# Patient Record
Sex: MALE | Race: OTHER | Employment: OTHER | URBAN - METROPOLITAN AREA
[De-identification: names, ages, dates, MRNs, and addresses within clinical notes are randomized per-mention and may not be internally consistent; named-entity substitution may affect disease eponyms.]

---

## 2017-08-31 ENCOUNTER — OFFICE VISIT (OUTPATIENT)
Dept: OTOLARYNGOLOGY | Facility: CLINIC | Age: 62
End: 2017-08-31

## 2017-08-31 VITALS
HEART RATE: 64 BPM | BODY MASS INDEX: 32.14 KG/M2 | WEIGHT: 200 LBS | TEMPERATURE: 98 F | DIASTOLIC BLOOD PRESSURE: 86 MMHG | HEIGHT: 66 IN | SYSTOLIC BLOOD PRESSURE: 124 MMHG

## 2017-08-31 DIAGNOSIS — H93.13 TINNITUS AURIUM, BILATERAL: Primary | ICD-10-CM

## 2017-08-31 DIAGNOSIS — H91.93 BILATERAL HEARING LOSS, UNSPECIFIED HEARING LOSS TYPE: ICD-10-CM

## 2017-08-31 PROCEDURE — 99212 OFFICE O/P EST SF 10 MIN: CPT | Performed by: OTOLARYNGOLOGY

## 2017-08-31 PROCEDURE — 99203 OFFICE O/P NEW LOW 30 MIN: CPT | Performed by: OTOLARYNGOLOGY

## 2017-08-31 NOTE — PROGRESS NOTES
Maryelizabeth Apgar is a 63year old male. Patient presents with:  Ear Problem: Bilateral  Hearing Loss  Cerumen Impaction: Right ear      HISTORY OF PRESENT ILLNESS  He presents with progressive hearing loss tinnitus as well as imbalance for the past few years. Negative Easy bleeding and easy bruising.            PHYSICAL EXAM    /86 (BP Location: Left arm)   Pulse 64   Temp (!) 97.5 °F (36.4 °C) (Tympanic)   Ht 5' 6\" (1.676 m)   Wt 200 lb (90.7 kg)   BMI 32.28 kg/m²         Constitutional Normal Overall ap opposed to the left side as he was a  for 20 years or more. He also does complain of hearing loss bilaterally.   I have recommended that he undergo an audiogram we will have to get this authorized through his insurance before having it performed her

## 2017-09-09 ENCOUNTER — TELEPHONE (OUTPATIENT)
Dept: OTOLARYNGOLOGY | Facility: CLINIC | Age: 62
End: 2017-09-09

## 2017-09-09 NOTE — TELEPHONE ENCOUNTER
Pt dose not speak english, advised pt daughter that prior authorizatoin approved for hearing test, Authorization # [id number] and expires 09/30/17. Pt daughter states will call back to schedule.

## 2017-09-18 ENCOUNTER — TELEPHONE (OUTPATIENT)
Dept: OTOLARYNGOLOGY | Facility: CLINIC | Age: 62
End: 2017-09-18

## 2017-09-18 NOTE — TELEPHONE ENCOUNTER
pts daughter Jose Antony called. She is asking for another PA for HT for the month of October. Next available with [facility] audiologist is Saturday 9/30/17. Which she cannot make and the PA expires on 9/30/17.

## 2017-09-21 NOTE — TELEPHONE ENCOUNTER
Spoke to pts daughter. Informed PA for HT was extended starting 9/30/17 until 10/30/17. Provided Auth # [id number]. Spoke to Viridiana from insurance and received extension for HT 9/30/17-10/30/17. No further action required at this time.

## 2017-10-12 ENCOUNTER — OFFICE VISIT (OUTPATIENT)
Dept: AUDIOLOGY | Facility: CLINIC | Age: 62
End: 2017-10-12

## 2017-10-12 ENCOUNTER — OFFICE VISIT (OUTPATIENT)
Dept: OTOLARYNGOLOGY | Facility: CLINIC | Age: 62
End: 2017-10-12

## 2017-10-12 VITALS
WEIGHT: 200 LBS | BODY MASS INDEX: 32 KG/M2 | SYSTOLIC BLOOD PRESSURE: 126 MMHG | DIASTOLIC BLOOD PRESSURE: 80 MMHG | TEMPERATURE: 98 F

## 2017-10-12 DIAGNOSIS — H90.3 SENSORINEURAL HEARING LOSS, BILATERAL: ICD-10-CM

## 2017-10-12 DIAGNOSIS — H93.11 TINNITUS, RIGHT: ICD-10-CM

## 2017-10-12 DIAGNOSIS — H91.93 BILATERAL HEARING LOSS, UNSPECIFIED HEARING LOSS TYPE: ICD-10-CM

## 2017-10-12 DIAGNOSIS — H93.13 TINNITUS AURIUM, BILATERAL: Primary | ICD-10-CM

## 2017-10-12 PROCEDURE — 92550 TYMPANOMETRY & REFLEX THRESH: CPT | Performed by: AUDIOLOGIST

## 2017-10-12 PROCEDURE — 92557 COMPREHENSIVE HEARING TEST: CPT | Performed by: AUDIOLOGIST

## 2017-10-12 PROCEDURE — 99212 OFFICE O/P EST SF 10 MIN: CPT | Performed by: OTOLARYNGOLOGY

## 2017-10-12 PROCEDURE — 99214 OFFICE O/P EST MOD 30 MIN: CPT | Performed by: OTOLARYNGOLOGY

## 2017-10-12 RX ORDER — RANITIDINE 150 MG/1
150 TABLET ORAL 2 TIMES DAILY
COMMUNITY
Start: 2017-08-29 | End: 2018-07-05

## 2017-10-12 RX ORDER — DICLOFENAC SODIUM 75 MG/1
75 TABLET, DELAYED RELEASE ORAL 2 TIMES DAILY
COMMUNITY
Start: 2017-08-29 | End: 2018-07-05

## 2017-10-12 RX ORDER — HYDROCHLOROTHIAZIDE 25 MG/1
25 TABLET ORAL DAILY
COMMUNITY
Start: 2017-08-29 | End: 2018-07-05

## 2017-10-12 RX ORDER — METOPROLOL TARTRATE 100 MG/1
100 TABLET ORAL DAILY
COMMUNITY
Start: 2017-08-29 | End: 2018-07-05

## 2017-10-12 NOTE — PROGRESS NOTES
AUDIOLOGY REPORT      Maryelizabeth Apgar is a 63year old male     Referring Provider: Blayne Gorman   YOB: 1955  Medical Record: [id number]      Patient Hearing History:  Patient was referred for hearing testing by Dr. Fermin Vang.    He is present with

## 2017-10-12 NOTE — PROGRESS NOTES
Herrera King is a 63year old male. Patient presents with:  Hearing Loss: Ringing both ears      HISTORY OF PRESENT ILLNESS    He presents with progressive hearing loss tinnitus as well as imbalance for the past few years.   He does have a long history of and wheezing. Cardio Negative Chest pain, irregular heartbeat/palpitations and syncope. GI Negative Abdominal pain and diarrhea. Endocrine Negative Cold intolerance and heat intolerance. Neuro Negative Tremors.    Psych Negative Anxiety and depressi Oral Tab, , Disp: , Rfl:   ASSESSMENT AND PLAN    1. Tinnitus aurium, bilateral    2. Bilateral hearing loss, unspecified hearing loss type  Asymmetric hearing loss with a mixed loss on the left and now worse sensorineural loss on the right.   He does have

## 2018-07-05 ENCOUNTER — OFFICE VISIT (OUTPATIENT)
Dept: INTERNAL MEDICINE CLINIC | Facility: CLINIC | Age: 63
End: 2018-07-05

## 2018-07-05 ENCOUNTER — LAB ENCOUNTER (OUTPATIENT)
Dept: LAB | Age: 63
End: 2018-07-05
Attending: INTERNAL MEDICINE
Payer: COMMERCIAL

## 2018-07-05 VITALS
HEIGHT: 65.5 IN | HEART RATE: 60 BPM | BODY MASS INDEX: 34.07 KG/M2 | TEMPERATURE: 98 F | WEIGHT: 207 LBS | SYSTOLIC BLOOD PRESSURE: 130 MMHG | RESPIRATION RATE: 16 BRPM | DIASTOLIC BLOOD PRESSURE: 86 MMHG

## 2018-07-05 DIAGNOSIS — K21.9 GASTROESOPHAGEAL REFLUX DISEASE, ESOPHAGITIS PRESENCE NOT SPECIFIED: ICD-10-CM

## 2018-07-05 DIAGNOSIS — Z00.00 PREVENTATIVE HEALTH CARE: ICD-10-CM

## 2018-07-05 DIAGNOSIS — J30.9 ALLERGIC RHINITIS, UNSPECIFIED SEASONALITY, UNSPECIFIED TRIGGER: ICD-10-CM

## 2018-07-05 DIAGNOSIS — I10 ESSENTIAL HYPERTENSION: Primary | ICD-10-CM

## 2018-07-05 DIAGNOSIS — M25.50 CHRONIC JOINT PAIN: ICD-10-CM

## 2018-07-05 DIAGNOSIS — G89.29 CHRONIC JOINT PAIN: ICD-10-CM

## 2018-07-05 DIAGNOSIS — R25.2 LEG CRAMPS: ICD-10-CM

## 2018-07-05 DIAGNOSIS — Z12.11 SCREENING FOR COLON CANCER: ICD-10-CM

## 2018-07-05 LAB
ALBUMIN SERPL-MCNC: 3.8 G/DL (ref 3.5–4.8)
ALP LIVER SERPL-CCNC: 44 U/L (ref 45–117)
ALT SERPL-CCNC: 29 U/L (ref 17–63)
AST SERPL-CCNC: 18 U/L (ref 15–41)
BASOPHILS # BLD AUTO: 0.06 X10(3) UL (ref 0–0.1)
BASOPHILS NFR BLD AUTO: 0.9 %
BILIRUB SERPL-MCNC: 0.3 MG/DL (ref 0.1–2)
BUN BLD-MCNC: 22 MG/DL (ref 8–20)
CALCIUM BLD-MCNC: 8.5 MG/DL (ref 8.3–10.3)
CHLORIDE: 105 MMOL/L (ref 101–111)
CHOLEST SMN-MCNC: 192 MG/DL (ref ?–200)
CO2: 26 MMOL/L (ref 22–32)
COMPLEXED PSA SERPL-MCNC: 0.36 NG/ML (ref 0.01–4)
CREAT BLD-MCNC: 1.02 MG/DL (ref 0.7–1.3)
EOSINOPHIL # BLD AUTO: 0.28 X10(3) UL (ref 0–0.3)
EOSINOPHIL NFR BLD AUTO: 4.2 %
ERYTHROCYTE [DISTWIDTH] IN BLOOD BY AUTOMATED COUNT: 12.7 % (ref 11.5–16)
EST. AVERAGE GLUCOSE BLD GHB EST-MCNC: 114 MG/DL (ref 68–126)
GLUCOSE BLD-MCNC: 89 MG/DL (ref 70–99)
HBA1C MFR BLD HPLC: 5.6 % (ref ?–5.7)
HCT VFR BLD AUTO: 42.1 % (ref 37–53)
HDLC SERPL-MCNC: 40 MG/DL (ref 45–?)
HDLC SERPL: 4.8 {RATIO} (ref ?–4.97)
HGB BLD-MCNC: 14.1 G/DL (ref 13–17)
IMMATURE GRANULOCYTE COUNT: 0.01 X10(3) UL (ref 0–1)
IMMATURE GRANULOCYTE RATIO %: 0.1 %
LDLC SERPL CALC-MCNC: 111 MG/DL (ref ?–130)
LYMPHOCYTES # BLD AUTO: 2.75 X10(3) UL (ref 0.9–4)
LYMPHOCYTES NFR BLD AUTO: 41.2 %
M PROTEIN MFR SERPL ELPH: 7.2 G/DL (ref 6.1–8.3)
MCH RBC QN AUTO: 32 PG (ref 27–33.2)
MCHC RBC AUTO-ENTMCNC: 33.5 G/DL (ref 31–37)
MCV RBC AUTO: 95.7 FL (ref 80–99)
MONOCYTES # BLD AUTO: 0.73 X10(3) UL (ref 0.1–1)
MONOCYTES NFR BLD AUTO: 10.9 %
NEUTROPHIL ABS PRELIM: 2.85 X10 (3) UL (ref 1.3–6.7)
NEUTROPHILS # BLD AUTO: 2.85 X10(3) UL (ref 1.3–6.7)
NEUTROPHILS NFR BLD AUTO: 42.7 %
NONHDLC SERPL-MCNC: 152 MG/DL (ref ?–130)
PLATELET # BLD AUTO: 218 10(3)UL (ref 150–450)
POTASSIUM SERPL-SCNC: 3.9 MMOL/L (ref 3.6–5.1)
RBC # BLD AUTO: 4.4 X10(6)UL (ref 4.3–5.7)
RED CELL DISTRIBUTION WIDTH-SD: 45 FL (ref 35.1–46.3)
SODIUM SERPL-SCNC: 139 MMOL/L (ref 136–144)
TRIGL SERPL-MCNC: 203 MG/DL (ref ?–150)
TSI SER-ACNC: 1.48 MIU/ML (ref 0.35–5.5)
VLDLC SERPL CALC-MCNC: 41 MG/DL (ref 5–40)
WBC # BLD AUTO: 6.7 X10(3) UL (ref 4–13)

## 2018-07-05 PROCEDURE — 83036 HEMOGLOBIN GLYCOSYLATED A1C: CPT

## 2018-07-05 PROCEDURE — 99204 OFFICE O/P NEW MOD 45 MIN: CPT | Performed by: INTERNAL MEDICINE

## 2018-07-05 PROCEDURE — 85025 COMPLETE CBC W/AUTO DIFF WBC: CPT

## 2018-07-05 PROCEDURE — 84443 ASSAY THYROID STIM HORMONE: CPT

## 2018-07-05 PROCEDURE — 80053 COMPREHEN METABOLIC PANEL: CPT

## 2018-07-05 PROCEDURE — 80061 LIPID PANEL: CPT

## 2018-07-05 PROCEDURE — 36415 COLL VENOUS BLD VENIPUNCTURE: CPT

## 2018-07-05 RX ORDER — DICLOFENAC SODIUM 75 MG/1
75 TABLET, DELAYED RELEASE ORAL 2 TIMES DAILY PRN
Qty: 60 TABLET | Refills: 2 | Status: SHIPPED | OUTPATIENT
Start: 2018-07-05 | End: 2018-10-08

## 2018-07-05 RX ORDER — METOPROLOL TARTRATE 100 MG/1
100 TABLET ORAL DAILY
Qty: 90 TABLET | Refills: 1 | Status: SHIPPED | OUTPATIENT
Start: 2018-07-05 | End: 2018-12-29

## 2018-07-05 RX ORDER — AMLODIPINE BESYLATE 5 MG/1
5 TABLET ORAL DAILY
Qty: 90 TABLET | Refills: 1 | Status: SHIPPED | OUTPATIENT
Start: 2018-07-05 | End: 2018-12-29

## 2018-07-05 RX ORDER — HYDROCHLOROTHIAZIDE 25 MG/1
25 TABLET ORAL DAILY
Qty: 90 TABLET | Refills: 1 | Status: SHIPPED | OUTPATIENT
Start: 2018-07-05 | End: 2018-12-29

## 2018-07-05 RX ORDER — AMLODIPINE BESYLATE 5 MG/1
5 TABLET ORAL DAILY
COMMUNITY
End: 2018-07-05

## 2018-07-05 RX ORDER — RANITIDINE 150 MG/1
150 TABLET ORAL 2 TIMES DAILY
Qty: 90 TABLET | Refills: 3 | Status: SHIPPED | OUTPATIENT
Start: 2018-07-05 | End: 2020-09-03 | Stop reason: ALTCHOICE

## 2018-07-05 NOTE — PROGRESS NOTES
Flaca Win is a 63year old male. HPI:   Patient presents with:  Establish Care  Medication Follow-Up: fasting  Patient is a new patient, here to establish care. Son-in-law in room to translate [place]).       Chronic issues:  Patient has hypertensio (1986). Family: family history includes Cancer in his father; Other in his mother. Social:  reports that he has never smoked. He has never used smokeless tobacco. He reports that he does not drink alcohol or use drugs.   Wt Readings from Last 6 Encounters ordered. Referred to General Surgery for screening colonoscopy. Previous PCP - Dr. Dr. Reinaldo Peabody from [facility]. Records request form filled out.       Patient Care Team:  Chela Stinson MD as PCP - General (Internal Medicine)  The patient in

## 2018-07-05 NOTE — PATIENT INSTRUCTIONS
- Get blood work done today  - Follow up with Dr. Cedrick Barahona for colonoscopy. - Follow up with us in 6 months for chronic issues, earlier as needed. It was a pleasure seeing you in the clinic today.   Thank you for choosing the [facility]

## 2018-08-20 RX ORDER — QUININE SULFATE 324 MG/1
1 CAPSULE ORAL DAILY
Refills: 1 | COMMUNITY
Start: 2018-08-12 | End: 2019-04-22 | Stop reason: ALTCHOICE

## 2018-08-21 RX ORDER — QUININE SULFATE 324 MG/1
324 CAPSULE ORAL DAILY
Status: CANCELLED | OUTPATIENT
Start: 2018-08-21

## 2018-08-22 RX ORDER — QUININE SULFATE 324 MG/1
CAPSULE ORAL
Qty: 30 CAPSULE | Refills: 0 | OUTPATIENT
Start: 2018-08-22

## 2018-08-22 NOTE — TELEPHONE ENCOUNTER
I would not recommend taking prescription quinine on a long term basis - the prescription doses are only for treatment of acute malaria.   Long-term use of the prescription quinine for uses aside from malaria treatment (I believe patient is using it for res

## 2018-10-08 RX ORDER — DICLOFENAC SODIUM 75 MG/1
TABLET, DELAYED RELEASE ORAL
Qty: 60 TABLET | Refills: 1 | Status: SHIPPED | OUTPATIENT
Start: 2018-10-08 | End: 2018-12-06

## 2018-10-08 NOTE — TELEPHONE ENCOUNTER
Diclofenac 75 mg 1 tab bid prn filled 7-5-18 60  With 2 refills     LOV 7-5-18     Has chronic shoulder and back pain - takes diclofenac on an as-needed basis.       return to clinic in 6 months     Labs 7-5-18

## 2018-12-06 DIAGNOSIS — M25.50 CHRONIC JOINT PAIN: Primary | ICD-10-CM

## 2018-12-06 DIAGNOSIS — G89.29 CHRONIC JOINT PAIN: Primary | ICD-10-CM

## 2018-12-06 RX ORDER — DICLOFENAC SODIUM 75 MG/1
75 TABLET, DELAYED RELEASE ORAL 2 TIMES DAILY PRN
Qty: 60 TABLET | Refills: 2 | Status: SHIPPED | OUTPATIENT
Start: 2018-12-06 | End: 2022-03-16

## 2018-12-31 RX ORDER — AMLODIPINE BESYLATE 5 MG/1
TABLET ORAL
Qty: 90 TABLET | Refills: 0 | Status: SHIPPED | OUTPATIENT
Start: 2018-12-31 | End: 2019-03-26

## 2018-12-31 RX ORDER — METOPROLOL TARTRATE 100 MG/1
TABLET ORAL
Qty: 90 TABLET | Refills: 0 | Status: SHIPPED | OUTPATIENT
Start: 2018-12-31 | End: 2019-03-26

## 2018-12-31 RX ORDER — HYDROCHLOROTHIAZIDE 25 MG/1
TABLET ORAL
Qty: 90 TABLET | Refills: 0 | Status: SHIPPED | OUTPATIENT
Start: 2018-12-31 | End: 2019-03-26

## 2018-12-31 NOTE — TELEPHONE ENCOUNTER
Refill requested:   Requested Prescriptions     Pending Prescriptions Disp Refills   • METOPROLOL TARTRATE 100 MG Oral Tab [Pharmacy Med Name: METOPROLOL TARTRATE 100MG TABLETS] 90 tablet 0     Sig: TAKE 1 TABLET(100 MG) BY MOUTH DAILY   • HYDROCHLOROTHIAZ

## 2019-03-26 DIAGNOSIS — I10 ESSENTIAL HYPERTENSION: Primary | ICD-10-CM

## 2019-03-26 RX ORDER — AMLODIPINE BESYLATE 5 MG/1
TABLET ORAL
Qty: 90 TABLET | Refills: 0 | Status: SHIPPED | OUTPATIENT
Start: 2019-03-26 | End: 2019-06-12

## 2019-03-26 RX ORDER — HYDROCHLOROTHIAZIDE 25 MG/1
TABLET ORAL
Qty: 90 TABLET | Refills: 0 | Status: SHIPPED | OUTPATIENT
Start: 2019-03-26 | End: 2019-06-12

## 2019-03-26 RX ORDER — METOPROLOL TARTRATE 100 MG/1
TABLET ORAL
Qty: 90 TABLET | Refills: 0 | Status: SHIPPED | OUTPATIENT
Start: 2019-03-26 | End: 2019-06-12

## 2019-03-26 NOTE — TELEPHONE ENCOUNTER
Last OV: 7/5/18 with Dr. Frank Welch  Last refill date: 12/31/18      #/refills: #90, 0 refills  When pt was asked to return for OV: 6 months   Upcoming appt/reason: no upcoming appt  Last labs 7/5/18

## 2019-04-17 ENCOUNTER — PATIENT OUTREACH (OUTPATIENT)
Dept: INTERNAL MEDICINE CLINIC | Facility: CLINIC | Age: 64
End: 2019-04-17

## 2019-04-17 NOTE — PROGRESS NOTES
Pt is IHP so will be on [employer]BS list but is HTN and not showing GAP for this FYI. Is due for appointment, last seen 7/2018.

## 2019-04-22 ENCOUNTER — OFFICE VISIT (OUTPATIENT)
Dept: INTERNAL MEDICINE CLINIC | Facility: CLINIC | Age: 64
End: 2019-04-22

## 2019-04-22 VITALS
TEMPERATURE: 98 F | SYSTOLIC BLOOD PRESSURE: 130 MMHG | RESPIRATION RATE: 16 BRPM | WEIGHT: 205.5 LBS | DIASTOLIC BLOOD PRESSURE: 84 MMHG | HEART RATE: 52 BPM | HEIGHT: 64.75 IN | BODY MASS INDEX: 34.66 KG/M2

## 2019-04-22 DIAGNOSIS — M25.50 CHRONIC JOINT PAIN: Chronic | ICD-10-CM

## 2019-04-22 DIAGNOSIS — R25.2 LEG CRAMPS: Primary | ICD-10-CM

## 2019-04-22 DIAGNOSIS — G89.29 CHRONIC JOINT PAIN: Chronic | ICD-10-CM

## 2019-04-22 DIAGNOSIS — K21.9 GASTROESOPHAGEAL REFLUX DISEASE, ESOPHAGITIS PRESENCE NOT SPECIFIED: Chronic | ICD-10-CM

## 2019-04-22 DIAGNOSIS — Z12.11 SCREENING FOR COLON CANCER: ICD-10-CM

## 2019-04-22 DIAGNOSIS — G25.81 RESTLESS LEGS: ICD-10-CM

## 2019-04-22 DIAGNOSIS — E78.2 MIXED HYPERLIPIDEMIA: ICD-10-CM

## 2019-04-22 DIAGNOSIS — I10 ESSENTIAL HYPERTENSION: Chronic | ICD-10-CM

## 2019-04-22 DIAGNOSIS — J30.9 ALLERGIC RHINITIS, UNSPECIFIED SEASONALITY, UNSPECIFIED TRIGGER: Chronic | ICD-10-CM

## 2019-04-22 PROCEDURE — 99214 OFFICE O/P EST MOD 30 MIN: CPT | Performed by: INTERNAL MEDICINE

## 2019-04-22 RX ORDER — ROPINIROLE 0.5 MG/1
0.5 TABLET, FILM COATED ORAL NIGHTLY
Qty: 30 TABLET | Refills: 0 | Status: SHIPPED | OUTPATIENT
Start: 2019-04-22 | End: 2019-05-18

## 2019-04-22 NOTE — PROGRESS NOTES
Carlyn Cárdenas is a 66year old male. HPI:   Patient presents with:  Hypertension  Patient presents for follow up on chronic medical issues. Hypertension - at goal blood pressure. Hyperlipidemia - lifestyle controlled. Chronic joint pain - stable.   GE drinks alcohol. He reports that he does not use drugs.   Wt Readings from Last 6 Encounters:  04/22/19 : 205 lb 8 oz  07/05/18 : 207 lb  10/12/17 : 200 lb  08/31/17 : 200 lb    EXAM:   /84 (BP Location: Left arm, Patient Position: Sitting, Cuff Size: issues and agrees to the plan. The patient is asked to return to clinic in 6 months for follow up on chronic issues, or earlier if acute issues arise.     Rhiannon Rodríguez MD

## 2019-04-22 NOTE — PATIENT INSTRUCTIONS
- Continue current blood pressure medications  - Get cholesterol test done when fasting (at least 8 hours, water and medications only). Our lab is open Monday - Friday, 7 AM - 4:15 PM ([place]   - Do stool blood test (see kit instructions)  - We

## 2019-04-23 PROBLEM — I10 ESSENTIAL HYPERTENSION: Chronic | Status: ACTIVE | Noted: 2018-07-05

## 2019-04-23 PROBLEM — E78.2 MIXED HYPERLIPIDEMIA: Chronic | Status: ACTIVE | Noted: 2019-04-23

## 2019-04-23 PROBLEM — E78.2 MIXED HYPERLIPIDEMIA: Status: ACTIVE | Noted: 2019-04-23

## 2019-04-23 PROBLEM — M25.50 CHRONIC JOINT PAIN: Chronic | Status: ACTIVE | Noted: 2018-07-05

## 2019-04-23 PROBLEM — R25.2 LEG CRAMPS: Chronic | Status: ACTIVE | Noted: 2018-07-05

## 2019-04-23 PROBLEM — J30.9 ALLERGIC RHINITIS: Chronic | Status: ACTIVE | Noted: 2018-07-05

## 2019-04-23 PROBLEM — G89.29 CHRONIC JOINT PAIN: Chronic | Status: ACTIVE | Noted: 2018-07-05

## 2019-04-23 PROBLEM — K21.9 GERD (GASTROESOPHAGEAL REFLUX DISEASE): Chronic | Status: ACTIVE | Noted: 2018-07-05

## 2019-04-23 PROBLEM — G25.81 RESTLESS LEGS: Status: ACTIVE | Noted: 2019-04-23

## 2019-05-18 DIAGNOSIS — R25.2 LEG CRAMPS: ICD-10-CM

## 2019-05-18 DIAGNOSIS — G25.81 RESTLESS LEGS: ICD-10-CM

## 2019-05-20 NOTE — TELEPHONE ENCOUNTER
Failed protocol     Last refill:   4/22/2019 #30 NR    LOV:  4/22/2019 Dr Taty Rivera RTC 6 months   2. Restless legs   Upon further discussion it appears patient has symptoms of restless leg syndrome. He has been taking high dose quinine.   I discussed that t

## 2019-05-21 RX ORDER — ROPINIROLE 0.5 MG/1
TABLET, FILM COATED ORAL
Qty: 90 TABLET | Refills: 1 | Status: SHIPPED | OUTPATIENT
Start: 2019-05-21 | End: 2019-09-13

## 2019-05-24 ENCOUNTER — PATIENT OUTREACH (OUTPATIENT)
Dept: INTERNAL MEDICINE CLINIC | Facility: CLINIC | Age: 64
End: 2019-05-24

## 2019-05-24 NOTE — PROGRESS NOTES
LMTCB x 1    Pt is due for Colon Screen. FIT test ordered on 4/22/2019, has pt been able to complete test?   If pt needs another test we can mail one out to him and he can drop it off at any [facility].

## 2019-05-24 NOTE — PROGRESS NOTES
Patient's daughter called back. States that he did receive the FIT test, however he has been unable to complete it.  Patient will make sure to complete it as soon as he can

## 2019-06-02 ENCOUNTER — APPOINTMENT (OUTPATIENT)
Dept: LAB | Facility: HOSPITAL | Age: 64
End: 2019-06-02
Attending: INTERNAL MEDICINE
Payer: COMMERCIAL

## 2019-06-02 DIAGNOSIS — Z12.11 SCREENING FOR COLON CANCER: ICD-10-CM

## 2019-06-02 PROCEDURE — 82274 ASSAY TEST FOR BLOOD FECAL: CPT

## 2019-06-12 DIAGNOSIS — I10 ESSENTIAL HYPERTENSION: ICD-10-CM

## 2019-06-12 RX ORDER — AMLODIPINE BESYLATE 5 MG/1
TABLET ORAL
Qty: 90 TABLET | Refills: 0 | Status: SHIPPED | OUTPATIENT
Start: 2019-06-12 | End: 2019-09-13

## 2019-06-12 RX ORDER — HYDROCHLOROTHIAZIDE 25 MG/1
TABLET ORAL
Qty: 90 TABLET | Refills: 0 | Status: SHIPPED | OUTPATIENT
Start: 2019-06-12 | End: 2019-09-13

## 2019-06-12 RX ORDER — METOPROLOL TARTRATE 100 MG/1
TABLET ORAL
Qty: 90 TABLET | Refills: 0 | Status: SHIPPED | OUTPATIENT
Start: 2019-06-12 | End: 2019-09-13

## 2019-06-12 NOTE — TELEPHONE ENCOUNTER
Hydrochlorothiazide 25 mg 1 tab daily filled 3/26/19 90 with 0 refills   Amlodipine 5 mg 1 tab daily filled 3/26/19 90 with 0 refills   Metoprolol 100 mg 1 tab daily filled 3/26/19 90 with 0 refills     LOV 4/22/19  return to clinic in 6 months   No upcomi

## 2019-06-18 ENCOUNTER — TELEPHONE (OUTPATIENT)
Dept: INTERNAL MEDICINE CLINIC | Facility: CLINIC | Age: 64
End: 2019-06-18

## 2019-07-12 ENCOUNTER — TELEPHONE (OUTPATIENT)
Dept: INTERNAL MEDICINE CLINIC | Facility: CLINIC | Age: 64
End: 2019-07-12

## 2019-07-12 NOTE — TELEPHONE ENCOUNTER
[facility] pharmacy is calling to get a refill on the patient's prescription for HYDROCHLOROTHIAZIDE 25 MG Oral Tab, ROPINIROLE HCL 0.5 MG Oral Tab, AMLODIPINE BESYLATE 5 MG Oral Tab, METOPROLOL TARTRATE 100 MG Oral Tab [facility] [id number] [facility]

## 2019-07-15 NOTE — TELEPHONE ENCOUNTER
Clarified with [facility] that pt did receive   #90 refills on all 4 medications and is not in need of refills at this time. They are looking for refill to \"syncronize all refills\".     Informed pharmacy that pt can request refills at the time of need

## 2019-09-13 DIAGNOSIS — R25.2 LEG CRAMPS: ICD-10-CM

## 2019-09-13 DIAGNOSIS — I10 ESSENTIAL HYPERTENSION: ICD-10-CM

## 2019-09-13 DIAGNOSIS — G25.81 RESTLESS LEGS: ICD-10-CM

## 2019-09-13 RX ORDER — HYDROCHLOROTHIAZIDE 25 MG/1
TABLET ORAL
Qty: 90 TABLET | Refills: 1 | Status: SHIPPED | OUTPATIENT
Start: 2019-09-13 | End: 2020-03-11

## 2019-09-13 RX ORDER — METOPROLOL TARTRATE 100 MG/1
TABLET ORAL
Qty: 90 TABLET | Refills: 1 | Status: SHIPPED | OUTPATIENT
Start: 2019-09-13 | End: 2020-03-11

## 2019-09-13 RX ORDER — ROPINIROLE 0.5 MG/1
TABLET, FILM COATED ORAL
Qty: 90 TABLET | Refills: 1 | Status: SHIPPED | OUTPATIENT
Start: 2019-09-13 | End: 2020-03-11

## 2019-09-13 RX ORDER — AMLODIPINE BESYLATE 5 MG/1
TABLET ORAL
Qty: 90 TABLET | Refills: 1 | Status: SHIPPED | OUTPATIENT
Start: 2019-09-13 | End: 2020-03-11

## 2019-09-13 NOTE — TELEPHONE ENCOUNTER
Failed protocol     Last refill:  6/12/2019 Metoprolol 100 mg #90 + Amlodipine 5 mg #90 NR + HCTZ 25 mg #90 NR   5/21/2019 Ropinirole . 5 mg #90 1R    LOV;   4/22/2019 Dr Dawson Don RTC 6 months   2.  Restless legs  Upon further discussion it appears patient ha

## 2019-09-13 NOTE — TELEPHONE ENCOUNTER
Spoke to pts dtr (on communication waiver) and informed pt is due for appt. She will have pt call our office back.

## 2020-03-11 DIAGNOSIS — G25.81 RESTLESS LEGS: ICD-10-CM

## 2020-03-11 DIAGNOSIS — Z00.00 PREVENTATIVE HEALTH CARE: ICD-10-CM

## 2020-03-11 DIAGNOSIS — I10 ESSENTIAL HYPERTENSION: ICD-10-CM

## 2020-03-11 DIAGNOSIS — Z12.5 SCREENING FOR MALIGNANT NEOPLASM OF PROSTATE: ICD-10-CM

## 2020-03-11 DIAGNOSIS — R25.2 LEG CRAMPS: ICD-10-CM

## 2020-03-11 DIAGNOSIS — E78.2 MIXED HYPERLIPIDEMIA: Primary | Chronic | ICD-10-CM

## 2020-03-11 RX ORDER — ROPINIROLE 0.5 MG/1
TABLET, FILM COATED ORAL
Qty: 90 TABLET | Refills: 1 | Status: SHIPPED | OUTPATIENT
Start: 2020-03-11 | End: 2020-07-06

## 2020-03-11 RX ORDER — HYDROCHLOROTHIAZIDE 25 MG/1
TABLET ORAL
Qty: 30 TABLET | Refills: 0 | Status: SHIPPED | OUTPATIENT
Start: 2020-03-11 | End: 2020-06-10

## 2020-03-11 RX ORDER — METOPROLOL TARTRATE 100 MG/1
TABLET ORAL
Qty: 30 TABLET | Refills: 0 | Status: SHIPPED | OUTPATIENT
Start: 2020-03-11 | End: 2020-06-10

## 2020-03-11 RX ORDER — AMLODIPINE BESYLATE 5 MG/1
TABLET ORAL
Qty: 30 TABLET | Refills: 0 | Status: SHIPPED | OUTPATIENT
Start: 2020-03-11 | End: 2020-06-12

## 2020-03-11 NOTE — TELEPHONE ENCOUNTER
Failed protocol     Last refill:  09/13/2019 Ropinirole ,5 mg #90 1R  09/13/2019 Amlodipine 5 mg #90 1R  09/13/2019 Metoprolol 100 mg #90 1R  09/13/2019 HCTZ 25 mg #90 1R    LOV:   04/22/2019 Dr Evelio Wing RTC 6 months  No FOV scheduled    **Spoke to pts dtr

## 2020-06-10 DIAGNOSIS — I10 ESSENTIAL HYPERTENSION: ICD-10-CM

## 2020-06-11 ENCOUNTER — TELEPHONE (OUTPATIENT)
Dept: INTERNAL MEDICINE CLINIC | Facility: CLINIC | Age: 65
End: 2020-06-11

## 2020-06-11 DIAGNOSIS — I10 ESSENTIAL HYPERTENSION: ICD-10-CM

## 2020-06-11 RX ORDER — HYDROCHLOROTHIAZIDE 25 MG/1
25 TABLET ORAL DAILY
Qty: 90 TABLET | Refills: 0 | Status: SHIPPED | OUTPATIENT
Start: 2020-06-11 | End: 2020-09-09

## 2020-06-11 RX ORDER — METOPROLOL TARTRATE 100 MG/1
100 TABLET ORAL DAILY
Qty: 90 TABLET | Refills: 0 | Status: SHIPPED | OUTPATIENT
Start: 2020-06-11 | End: 2020-09-09

## 2020-06-11 NOTE — TELEPHONE ENCOUNTER
Metoprolol 100 mg 1 tab daily filled 3-11-20 30 with 0 refills   Hydrochlorothiazide 25 mg 1 tab daily filled 3-11-20 30 with 0 refills     LOV 4-22-19   return to clinic in 6 months   No upcoming apt on file   Labs 7-5-18     Apt made 6-15-20 pt has a blo

## 2020-06-12 RX ORDER — METOPROLOL TARTRATE 100 MG/1
TABLET ORAL
Qty: 30 TABLET | Refills: 0 | OUTPATIENT
Start: 2020-06-12

## 2020-06-12 RX ORDER — AMLODIPINE BESYLATE 5 MG/1
TABLET ORAL
Qty: 90 TABLET | Refills: 0 | Status: SHIPPED | OUTPATIENT
Start: 2020-06-12 | End: 2020-06-16

## 2020-06-12 RX ORDER — HYDROCHLOROTHIAZIDE 25 MG/1
TABLET ORAL
Qty: 30 TABLET | Refills: 0 | OUTPATIENT
Start: 2020-06-12

## 2020-06-12 NOTE — TELEPHONE ENCOUNTER
[address] called and stated that they never received the prescription for AMLODIPINE BESYLATE 5MG TABLETS. Please resend prescription.  [facility] [address] [facility], [address], [phone], [phone]

## 2020-06-12 NOTE — TELEPHONE ENCOUNTER
AMLODIPINE BESYLATE 5MG TABLETS  Protocol Failed     LOV: 4/22/19   RTC: 6 months   Upcoming OV: 6/15/2020   Filled: 3/11/2020 #30, 0 refills   Recent labs: 7/5/18     METOPROLOL TARTRATE 100MG TABLETS  HYDROCHLOROTHIAZIDE 25MG TABLETS  These medications w

## 2020-06-15 ENCOUNTER — TELEMEDICINE (OUTPATIENT)
Dept: INTERNAL MEDICINE CLINIC | Facility: CLINIC | Age: 65
End: 2020-06-15

## 2020-06-15 VITALS — SYSTOLIC BLOOD PRESSURE: 130 MMHG | DIASTOLIC BLOOD PRESSURE: 76 MMHG

## 2020-06-15 DIAGNOSIS — R25.2 LEG CRAMPS: Chronic | ICD-10-CM

## 2020-06-15 DIAGNOSIS — K21.9 GASTROESOPHAGEAL REFLUX DISEASE, ESOPHAGITIS PRESENCE NOT SPECIFIED: Chronic | ICD-10-CM

## 2020-06-15 DIAGNOSIS — M25.50 CHRONIC JOINT PAIN: Chronic | ICD-10-CM

## 2020-06-15 DIAGNOSIS — I10 ESSENTIAL HYPERTENSION: Primary | Chronic | ICD-10-CM

## 2020-06-15 DIAGNOSIS — G89.29 CHRONIC JOINT PAIN: Chronic | ICD-10-CM

## 2020-06-15 DIAGNOSIS — Z12.11 SCREENING FOR MALIGNANT NEOPLASM OF COLON: ICD-10-CM

## 2020-06-15 DIAGNOSIS — G25.81 RESTLESS LEGS: ICD-10-CM

## 2020-06-15 DIAGNOSIS — J30.9 ALLERGIC RHINITIS, UNSPECIFIED SEASONALITY, UNSPECIFIED TRIGGER: Chronic | ICD-10-CM

## 2020-06-15 DIAGNOSIS — E78.2 MIXED HYPERLIPIDEMIA: Chronic | ICD-10-CM

## 2020-06-15 PROCEDURE — 99214 OFFICE O/P EST MOD 30 MIN: CPT | Performed by: INTERNAL MEDICINE

## 2020-06-15 NOTE — PROGRESS NOTES
Dulce Keller is a 60year old male here today for a telemedicine/video visit via [facility]. He is in the state of [place] during this visit. Dulce Keller verbally consents to a Video visit service on 06/15/20.   Patient understands and accepts financial r affect    ASSESSMENT/PLAN:  1. Essential hypertension  Excellent readings at home including during today's video visit. Continue amlodipine 5 mg qd, HCTZ 25 mg qd, metoprolol tartrate 100 mg qd.   May consider switching metoprolol to long-acting succinate

## 2020-06-16 RX ORDER — AMLODIPINE BESYLATE 5 MG/1
5 TABLET ORAL DAILY
Qty: 90 TABLET | Refills: 0 | Status: SHIPPED | OUTPATIENT
Start: 2020-06-16 | End: 2020-12-07

## 2020-07-05 DIAGNOSIS — R25.2 LEG CRAMPS: ICD-10-CM

## 2020-07-05 DIAGNOSIS — G25.81 RESTLESS LEGS: ICD-10-CM

## 2020-07-06 RX ORDER — ROPINIROLE 0.5 MG/1
TABLET, FILM COATED ORAL
Qty: 90 TABLET | Refills: 1 | Status: SHIPPED | OUTPATIENT
Start: 2020-07-06 | End: 2021-04-01

## 2020-07-06 NOTE — TELEPHONE ENCOUNTER
Failed protocol     Last refill:  3/11/2020 Ropinirole 0.5 mg #90 1R    Last virtual appt 6/15/2020 Dr Ordonez Face RTC 6 months  7. Leg cramps  Symptoms much better since he is working less. Not really using the ropinirole right now.   No FOV scheduled

## 2020-07-23 ENCOUNTER — LABORATORY ENCOUNTER (OUTPATIENT)
Dept: LAB | Age: 65
End: 2020-07-23
Attending: INTERNAL MEDICINE
Payer: COMMERCIAL

## 2020-07-23 DIAGNOSIS — Z00.00 PREVENTATIVE HEALTH CARE: ICD-10-CM

## 2020-07-23 DIAGNOSIS — Z12.5 SCREENING FOR MALIGNANT NEOPLASM OF PROSTATE: ICD-10-CM

## 2020-07-23 DIAGNOSIS — I10 ESSENTIAL HYPERTENSION: ICD-10-CM

## 2020-07-23 DIAGNOSIS — E78.2 MIXED HYPERLIPIDEMIA: Chronic | ICD-10-CM

## 2020-07-23 LAB
ALBUMIN SERPL-MCNC: 3.8 G/DL (ref 3.4–5)
ALBUMIN/GLOB SERPL: 1.1 {RATIO} (ref 1–2)
ALP LIVER SERPL-CCNC: 42 U/L (ref 45–117)
ALT SERPL-CCNC: 30 U/L (ref 16–61)
ANION GAP SERPL CALC-SCNC: 3 MMOL/L (ref 0–18)
AST SERPL-CCNC: 14 U/L (ref 15–37)
BASOPHILS # BLD AUTO: 0.06 X10(3) UL (ref 0–0.2)
BASOPHILS NFR BLD AUTO: 0.9 %
BILIRUB SERPL-MCNC: 0.5 MG/DL (ref 0.1–2)
BUN BLD-MCNC: 16 MG/DL (ref 7–18)
BUN/CREAT SERPL: 15.5 (ref 10–20)
CALCIUM BLD-MCNC: 8.8 MG/DL (ref 8.5–10.1)
CHLORIDE SERPL-SCNC: 105 MMOL/L (ref 98–112)
CHOLEST SMN-MCNC: 209 MG/DL (ref ?–200)
CO2 SERPL-SCNC: 29 MMOL/L (ref 21–32)
COMPLEXED PSA SERPL-MCNC: 0.38 NG/ML (ref ?–4)
CREAT BLD-MCNC: 1.03 MG/DL (ref 0.7–1.3)
DEPRECATED RDW RBC AUTO: 43.8 FL (ref 35.1–46.3)
EOSINOPHIL # BLD AUTO: 0.29 X10(3) UL (ref 0–0.7)
EOSINOPHIL NFR BLD AUTO: 4.4 %
ERYTHROCYTE [DISTWIDTH] IN BLOOD BY AUTOMATED COUNT: 12.8 % (ref 11–15)
EST. AVERAGE GLUCOSE BLD GHB EST-MCNC: 105 MG/DL (ref 68–126)
GLOBULIN PLAS-MCNC: 3.4 G/DL (ref 2.8–4.4)
GLUCOSE BLD-MCNC: 89 MG/DL (ref 70–99)
HBA1C MFR BLD HPLC: 5.3 % (ref ?–5.7)
HCT VFR BLD AUTO: 42.1 % (ref 39–53)
HDLC SERPL-MCNC: 32 MG/DL (ref 40–59)
HGB BLD-MCNC: 14.3 G/DL (ref 13–17.5)
IMM GRANULOCYTES # BLD AUTO: 0.01 X10(3) UL (ref 0–1)
IMM GRANULOCYTES NFR BLD: 0.2 %
LDLC SERPL CALC-MCNC: 131 MG/DL (ref ?–100)
LYMPHOCYTES # BLD AUTO: 3.15 X10(3) UL (ref 1–4)
LYMPHOCYTES NFR BLD AUTO: 48 %
M PROTEIN MFR SERPL ELPH: 7.2 G/DL (ref 6.4–8.2)
MCH RBC QN AUTO: 31.7 PG (ref 26–34)
MCHC RBC AUTO-ENTMCNC: 34 G/DL (ref 31–37)
MCV RBC AUTO: 93.3 FL (ref 80–100)
MONOCYTES # BLD AUTO: 0.64 X10(3) UL (ref 0.1–1)
MONOCYTES NFR BLD AUTO: 9.8 %
NEUTROPHILS # BLD AUTO: 2.41 X10 (3) UL (ref 1.5–7.7)
NEUTROPHILS # BLD AUTO: 2.41 X10(3) UL (ref 1.5–7.7)
NEUTROPHILS NFR BLD AUTO: 36.7 %
NONHDLC SERPL-MCNC: 177 MG/DL (ref ?–130)
OSMOLALITY SERPL CALC.SUM OF ELEC: 285 MOSM/KG (ref 275–295)
PATIENT FASTING Y/N/NP: YES
PATIENT FASTING Y/N/NP: YES
PLATELET # BLD AUTO: 177 10(3)UL (ref 150–450)
POTASSIUM SERPL-SCNC: 3.9 MMOL/L (ref 3.5–5.1)
RBC # BLD AUTO: 4.51 X10(6)UL (ref 4.3–5.7)
SODIUM SERPL-SCNC: 137 MMOL/L (ref 136–145)
TRIGL SERPL-MCNC: 229 MG/DL (ref 30–149)
TSI SER-ACNC: 1.79 MIU/ML (ref 0.36–3.74)
VLDLC SERPL CALC-MCNC: 46 MG/DL (ref 0–30)
WBC # BLD AUTO: 6.6 X10(3) UL (ref 4–11)

## 2020-07-23 PROCEDURE — 36415 COLL VENOUS BLD VENIPUNCTURE: CPT

## 2020-07-23 PROCEDURE — 84443 ASSAY THYROID STIM HORMONE: CPT

## 2020-07-23 PROCEDURE — 85025 COMPLETE CBC W/AUTO DIFF WBC: CPT

## 2020-07-23 PROCEDURE — 80053 COMPREHEN METABOLIC PANEL: CPT

## 2020-07-23 PROCEDURE — 83036 HEMOGLOBIN GLYCOSYLATED A1C: CPT

## 2020-07-23 PROCEDURE — 80061 LIPID PANEL: CPT

## 2020-07-28 ENCOUNTER — TELEPHONE (OUTPATIENT)
Dept: INTERNAL MEDICINE CLINIC | Facility: CLINIC | Age: 65
End: 2020-07-28

## 2020-07-28 DIAGNOSIS — E78.5 HYPERLIPIDEMIA, UNSPECIFIED HYPERLIPIDEMIA TYPE: Primary | ICD-10-CM

## 2020-07-29 RX ORDER — ATORVASTATIN CALCIUM 10 MG/1
10 TABLET, FILM COATED ORAL NIGHTLY
Qty: 90 TABLET | Refills: 1 | Status: SHIPPED | OUTPATIENT
Start: 2020-07-29 | End: 2021-01-22

## 2020-09-03 ENCOUNTER — OFFICE VISIT (OUTPATIENT)
Dept: INTERNAL MEDICINE CLINIC | Facility: CLINIC | Age: 65
End: 2020-09-03

## 2020-09-03 DIAGNOSIS — I10 ESSENTIAL HYPERTENSION: Chronic | ICD-10-CM

## 2020-09-03 DIAGNOSIS — R21 RASH AND NONSPECIFIC SKIN ERUPTION: ICD-10-CM

## 2020-09-03 DIAGNOSIS — K21.9 GASTROESOPHAGEAL REFLUX DISEASE, ESOPHAGITIS PRESENCE NOT SPECIFIED: Chronic | ICD-10-CM

## 2020-09-03 DIAGNOSIS — J30.9 ALLERGIC RHINITIS, UNSPECIFIED SEASONALITY, UNSPECIFIED TRIGGER: Primary | Chronic | ICD-10-CM

## 2020-09-03 DIAGNOSIS — E78.2 MIXED HYPERLIPIDEMIA: Chronic | ICD-10-CM

## 2020-09-03 PROCEDURE — 86003 ALLG SPEC IGE CRUDE XTRC EA: CPT | Performed by: INTERNAL MEDICINE

## 2020-09-03 PROCEDURE — 3008F BODY MASS INDEX DOCD: CPT | Performed by: INTERNAL MEDICINE

## 2020-09-03 PROCEDURE — 3075F SYST BP GE 130 - 139MM HG: CPT | Performed by: INTERNAL MEDICINE

## 2020-09-03 PROCEDURE — 3079F DIAST BP 80-89 MM HG: CPT | Performed by: INTERNAL MEDICINE

## 2020-09-03 PROCEDURE — 82785 ASSAY OF IGE: CPT | Performed by: INTERNAL MEDICINE

## 2020-09-03 PROCEDURE — 99214 OFFICE O/P EST MOD 30 MIN: CPT | Performed by: INTERNAL MEDICINE

## 2020-09-03 RX ORDER — AZELASTINE HYDROCHLORIDE 0.5 MG/ML
1 SOLUTION/ DROPS OPHTHALMIC 2 TIMES DAILY
Qty: 6 ML | Refills: 1 | Status: SHIPPED | OUTPATIENT
Start: 2020-09-03

## 2020-09-03 RX ORDER — AZELASTINE 1 MG/ML
1 SPRAY, METERED NASAL 2 TIMES DAILY
Qty: 30 ML | Refills: 1 | Status: SHIPPED | OUTPATIENT
Start: 2020-09-03

## 2020-09-03 NOTE — PATIENT INSTRUCTIONS
- We will check allergy blood tests today  - Start azelastine nasal spray and eye drops.   Use twice daily.  - If this does not help your symptoms within 1-2 weeks, let us know - we may refer you to an allergist at that point.  - For the skin rash, start tr

## 2020-09-03 NOTE — PROGRESS NOTES
Sowmya Arriaga is a 63year old male. HPI:   Patient presents with: Allergies  Rash    Patient presents with complaints of allergic symptoms for two weeks. Sneezing, congestion, watery eyes, itching. Also with bites/spots on arms and legs.   Areas itch daily., Disp: 90 tablet, Rfl: 0  •  Diclofenac Sodium 75 MG Oral Tab EC, Take 1 tablet (75 mg total) by mouth 2 (two) times daily as needed. , Disp: 60 tablet, Rfl: 2  Medical:  has a past medical history of Tachycardia.   Surgical:  has a past surgical hist External Cream; Apply topically 2 (two) times daily as needed. Dispense: 45 g; Refill: 1    3. Essential hypertension  Repeat reading at goal.  Continue amlodipine 5 mg qd, HCTZ 25 mg qd, metoprolol succinate 100 mg qd.     4. Mixed hyperlipidemia  Elevate

## 2020-09-06 VITALS
WEIGHT: 225 LBS | RESPIRATION RATE: 16 BRPM | HEART RATE: 60 BPM | SYSTOLIC BLOOD PRESSURE: 136 MMHG | TEMPERATURE: 98 F | HEIGHT: 64.75 IN | BODY MASS INDEX: 37.95 KG/M2 | DIASTOLIC BLOOD PRESSURE: 84 MMHG

## 2020-09-07 DIAGNOSIS — I10 ESSENTIAL HYPERTENSION: ICD-10-CM

## 2020-09-08 LAB
A ALTERNATA IGE QN: <0.1 KUA/L (ref ?–0.1)
A FUMIGATUS IGE QN: <0.1 KUA/L (ref ?–0.1)
AMER SYCAMORE IGE QN: <0.1 KUA/L (ref ?–0.1)
BERMUDA GRASS IGE QN: <0.1 KUA/L (ref ?–0.1)
BOXELDER IGE QN: <0.1 KUA/L (ref ?–0.1)
C HERBARUM IGE QN: <0.1 KUA/L (ref ?–0.1)
CALIF WALNUT IGE QN: <0.1 KUA/L (ref ?–0.1)
CAT DANDER IGE QN: <0.1 KUA/L (ref ?–0.1)
CMN PIGWEED IGE QN: <0.1 KUA/L (ref ?–0.1)
COMMON RAGWEED IGE QN: 4.07 KUA/L (ref ?–0.1)
COTTONWOOD IGE QN: <0.1 KUA/L (ref ?–0.1)
D FARINAE IGE QN: <0.1 KUA/L (ref ?–0.1)
D PTERONYSS IGE QN: <0.1 KUA/L (ref ?–0.1)
DOG DANDER IGE QN: <0.1 KUA/L (ref ?–0.1)
IGE SERPL-ACNC: 39.1 KU/L (ref 2–214)
M RACEMOSUS IGE QN: <0.1 KUA/L (ref ?–0.1)
MARSH ELDER IGE QN: 5.97 KUA/L (ref ?–0.1)
MOUSE EPITH IGE QN: <0.1 KUA/L (ref ?–0.1)
MT JUNIPER IGE QN: 0.1 KUA/L (ref ?–0.1)
P NOTATUM IGE QN: <0.1 KUA/L (ref ?–0.1)
PECAN/HICK TREE IGE QN: <0.1 KUA/L (ref ?–0.1)
ROACH IGE QN: <0.1 KUA/L (ref ?–0.1)
SALTWORT IGE QN: <0.1 KUA/L (ref ?–0.1)
TIMOTHY IGE QN: 0.12 KUA/L (ref ?–0.1)
WHITE ASH IGE QN: <0.1 KUA/L (ref ?–0.1)
WHITE ELM IGE QN: <0.1 KUA/L (ref ?–0.1)
WHITE MULBERRY IGE QN: <0.1 KUA/L (ref ?–0.1)
WHITE OAK IGE QN: <0.1 KUA/L (ref ?–0.1)

## 2020-09-09 RX ORDER — METOPROLOL TARTRATE 100 MG/1
TABLET ORAL
Qty: 90 TABLET | Refills: 0 | Status: SHIPPED | OUTPATIENT
Start: 2020-09-09 | End: 2020-12-07

## 2020-09-09 RX ORDER — HYDROCHLOROTHIAZIDE 25 MG/1
TABLET ORAL
Qty: 90 TABLET | Refills: 0 | Status: SHIPPED | OUTPATIENT
Start: 2020-09-09 | End: 2020-12-07

## 2020-09-09 NOTE — TELEPHONE ENCOUNTER
METOPROLOL TARTRATE 100MG TABLETS  Protocol Passed   HYDROCHLOROTHIAZIDE 25MG TABLETS  Protocol Passed     LOV: 9/3/2020   3. Essential hypertension  Repeat reading at goal.  Continue amlodipine 5 mg qd, HCTZ 25 mg qd, metoprolol succinate 100 mg qd.   RTC:

## 2020-12-06 DIAGNOSIS — I10 ESSENTIAL HYPERTENSION: ICD-10-CM

## 2020-12-07 RX ORDER — AMLODIPINE BESYLATE 5 MG/1
TABLET ORAL
Qty: 90 TABLET | Refills: 0 | Status: SHIPPED | OUTPATIENT
Start: 2020-12-07 | End: 2021-03-08

## 2020-12-07 RX ORDER — HYDROCHLOROTHIAZIDE 25 MG/1
TABLET ORAL
Qty: 90 TABLET | Refills: 0 | Status: SHIPPED | OUTPATIENT
Start: 2020-12-07 | End: 2021-03-08

## 2020-12-07 RX ORDER — METOPROLOL TARTRATE 100 MG/1
TABLET ORAL
Qty: 90 TABLET | Refills: 0 | Status: SHIPPED | OUTPATIENT
Start: 2020-12-07 | End: 2021-03-08

## 2020-12-07 NOTE — TELEPHONE ENCOUNTER
Name from pharmacy: HYDROCHLOROTHIAZIDE 25MG TABLETS          Will file in chart as: HYDROCHLOROTHIAZIDE 25 MG Oral Tab    Sig: TAKE 1 TABLET BY MOUTH EVERY DAY    Disp:  90 tablet    Refills:  0 (Pharmacy requested: Not specified)    Start: 12/6/2020    C Hydrochlorothiazide- 9/9/20 #9 with no refill   Amlodipine Besylate- 6/16/20 #90 with no refills     Medication routed to PCP for refill if appropriate. No future appointments.

## 2021-01-21 DIAGNOSIS — E78.5 HYPERLIPIDEMIA, UNSPECIFIED HYPERLIPIDEMIA TYPE: ICD-10-CM

## 2021-01-22 RX ORDER — ATORVASTATIN CALCIUM 10 MG/1
10 TABLET, FILM COATED ORAL NIGHTLY
Qty: 90 TABLET | Refills: 1 | Status: SHIPPED | OUTPATIENT
Start: 2021-01-22 | End: 2021-06-29

## 2021-03-08 DIAGNOSIS — I10 ESSENTIAL HYPERTENSION: ICD-10-CM

## 2021-03-09 RX ORDER — HYDROCHLOROTHIAZIDE 25 MG/1
25 TABLET ORAL DAILY
Qty: 90 TABLET | Refills: 0 | Status: SHIPPED | OUTPATIENT
Start: 2021-03-09 | End: 2021-06-04

## 2021-03-09 RX ORDER — METOPROLOL TARTRATE 100 MG/1
100 TABLET ORAL DAILY
Qty: 90 TABLET | Refills: 0 | Status: SHIPPED | OUTPATIENT
Start: 2021-03-09 | End: 2021-06-04

## 2021-03-09 RX ORDER — AMLODIPINE BESYLATE 5 MG/1
5 TABLET ORAL DAILY
Qty: 90 TABLET | Refills: 0 | Status: SHIPPED | OUTPATIENT
Start: 2021-03-09 | End: 2021-06-04

## 2021-03-09 NOTE — TELEPHONE ENCOUNTER
Medication(s) to Refill:   Requested Prescriptions     Pending Prescriptions Disp Refills   • Metoprolol Tartrate 100 MG Oral Tab 90 tablet 0     Sig: Take 1 tablet (100 mg total) by mouth daily.    • amLODIPine Besylate 5 MG Oral Tab 90 tablet 0     Sig: T

## 2021-04-01 DIAGNOSIS — G25.81 RESTLESS LEGS: ICD-10-CM

## 2021-04-01 DIAGNOSIS — R25.2 LEG CRAMPS: ICD-10-CM

## 2021-04-01 RX ORDER — ROPINIROLE 0.5 MG/1
0.5 TABLET, FILM COATED ORAL EVERY EVENING
Qty: 90 TABLET | Refills: 0 | Status: SHIPPED | OUTPATIENT
Start: 2021-04-01 | End: 2021-06-30

## 2021-04-01 NOTE — TELEPHONE ENCOUNTER
Failed protocol     Last refill:  7/6/2020 Ropinirole 0.5 mg #90 1R    LOV:   9/3/2020 Dr Angelse Alas RTC 6 months  No FOV scheduled

## 2021-06-04 DIAGNOSIS — I10 ESSENTIAL HYPERTENSION: ICD-10-CM

## 2021-06-04 RX ORDER — METOPROLOL TARTRATE 100 MG/1
TABLET ORAL
Qty: 90 TABLET | Refills: 0 | Status: SHIPPED | OUTPATIENT
Start: 2021-06-04 | End: 2021-10-01

## 2021-06-04 RX ORDER — AMLODIPINE BESYLATE 5 MG/1
TABLET ORAL
Qty: 90 TABLET | Refills: 0 | Status: SHIPPED | OUTPATIENT
Start: 2021-06-04 | End: 2021-10-01

## 2021-06-04 RX ORDER — HYDROCHLOROTHIAZIDE 25 MG/1
TABLET ORAL
Qty: 90 TABLET | Refills: 0 | Status: SHIPPED | OUTPATIENT
Start: 2021-06-04 | End: 2021-10-01

## 2021-06-04 NOTE — TELEPHONE ENCOUNTER
Name from pharmacy: METOPROLOL TARTRATE 100MG TABLETS          Will file in chart as: METOPROLOL TARTRATE 100 MG Oral Tab    Sig: TAKE 1 TABLET(100 MG) BY MOUTH DAILY    Disp:  90 tablet    Refills:  0 (Pharmacy requested: Not specified)    Start: 6/4/2021 Amlodipine Besylate- 3/9/21#90 with no refills   Hydrochlorothiazide-3/9/21#90 with no refills     Medication routed to PCP for refill if appropriate. No future appointments.     . Essential hypertension  Repeat reading at goal.  Continue amlodipine 5 mg

## 2021-06-29 DIAGNOSIS — E78.5 HYPERLIPIDEMIA, UNSPECIFIED HYPERLIPIDEMIA TYPE: ICD-10-CM

## 2021-06-29 RX ORDER — ATORVASTATIN CALCIUM 10 MG/1
TABLET, FILM COATED ORAL
Qty: 90 TABLET | Refills: 0 | Status: SHIPPED | OUTPATIENT
Start: 2021-06-29 | End: 2021-10-01

## 2021-06-30 DIAGNOSIS — G25.81 RESTLESS LEGS: ICD-10-CM

## 2021-06-30 DIAGNOSIS — R25.2 LEG CRAMPS: ICD-10-CM

## 2021-06-30 RX ORDER — ROPINIROLE 0.5 MG/1
TABLET, FILM COATED ORAL
Qty: 90 TABLET | Refills: 0 | Status: SHIPPED | OUTPATIENT
Start: 2021-06-30 | End: 2021-09-28

## 2021-06-30 NOTE — TELEPHONE ENCOUNTER
Please contact pt to schedule ov -     Failed protocol     Last refill:  4/1/2021 Ropinirole 0.5 #90 NR    LOV:   9/3/2020 Dr Mark Glynn RTC 6 months  No FOV scheduled

## 2021-07-14 ENCOUNTER — PATIENT OUTREACH (OUTPATIENT)
Dept: INTERNAL MEDICINE CLINIC | Facility: CLINIC | Age: 66
End: 2021-07-14

## 2021-08-19 ENCOUNTER — TELEPHONE (OUTPATIENT)
Dept: INTERNAL MEDICINE CLINIC | Facility: CLINIC | Age: 66
End: 2021-08-19

## 2021-08-19 NOTE — TELEPHONE ENCOUNTER
Patients daughter called requesting a referral for a cardiologist. Patients daughter stated that patient has complained of chest discomfort and headaches for a few months now and he has had heart issues in the past. Daughter does prefer that the referral i

## 2021-08-19 NOTE — TELEPHONE ENCOUNTER
Spoke with daughter who states that patient is experiencing recurrent headaches and intermittent stabbing left sided chest pain for approximately 3 months. Headaches are relieved with ibuprofen and motrin--states occurring about every other day.  Chest pain

## 2021-09-02 DIAGNOSIS — I10 ESSENTIAL HYPERTENSION: ICD-10-CM

## 2021-09-02 RX ORDER — AMLODIPINE BESYLATE 5 MG/1
TABLET ORAL
Qty: 90 TABLET | Refills: 0 | OUTPATIENT
Start: 2021-09-02

## 2021-09-02 RX ORDER — HYDROCHLOROTHIAZIDE 25 MG/1
TABLET ORAL
Qty: 90 TABLET | Refills: 0 | OUTPATIENT
Start: 2021-09-02

## 2021-09-02 RX ORDER — METOPROLOL TARTRATE 100 MG/1
TABLET ORAL
Qty: 90 TABLET | Refills: 0 | OUTPATIENT
Start: 2021-09-02

## 2021-09-28 DIAGNOSIS — R25.2 LEG CRAMPS: ICD-10-CM

## 2021-09-28 DIAGNOSIS — G25.81 RESTLESS LEGS: ICD-10-CM

## 2021-09-28 RX ORDER — ROPINIROLE 0.5 MG/1
TABLET, FILM COATED ORAL
Qty: 90 TABLET | Refills: 0 | Status: SHIPPED | OUTPATIENT
Start: 2021-09-28 | End: 2021-12-27

## 2021-10-01 ENCOUNTER — OFFICE VISIT (OUTPATIENT)
Dept: INTERNAL MEDICINE CLINIC | Facility: CLINIC | Age: 66
End: 2021-10-01

## 2021-10-01 ENCOUNTER — LAB ENCOUNTER (OUTPATIENT)
Dept: LAB | Age: 66
End: 2021-10-01
Attending: INTERNAL MEDICINE
Payer: COMMERCIAL

## 2021-10-01 VITALS
TEMPERATURE: 98 F | DIASTOLIC BLOOD PRESSURE: 84 MMHG | HEART RATE: 46 BPM | HEIGHT: 64.75 IN | RESPIRATION RATE: 16 BRPM | BODY MASS INDEX: 37.88 KG/M2 | SYSTOLIC BLOOD PRESSURE: 130 MMHG | WEIGHT: 224.63 LBS

## 2021-10-01 DIAGNOSIS — Z12.5 SCREENING FOR MALIGNANT NEOPLASM OF PROSTATE: ICD-10-CM

## 2021-10-01 DIAGNOSIS — E78.2 MIXED HYPERLIPIDEMIA: ICD-10-CM

## 2021-10-01 DIAGNOSIS — Z23 NEED FOR IMMUNIZATION AGAINST INFLUENZA: ICD-10-CM

## 2021-10-01 DIAGNOSIS — I10 ESSENTIAL HYPERTENSION: ICD-10-CM

## 2021-10-01 DIAGNOSIS — K21.9 GASTROESOPHAGEAL REFLUX DISEASE, UNSPECIFIED WHETHER ESOPHAGITIS PRESENT: ICD-10-CM

## 2021-10-01 DIAGNOSIS — Z12.11 SCREENING FOR MALIGNANT NEOPLASM OF COLON: ICD-10-CM

## 2021-10-01 DIAGNOSIS — Z00.00 ENCOUNTER FOR PREVENTATIVE ADULT HEALTH CARE EXAMINATION: Primary | ICD-10-CM

## 2021-10-01 DIAGNOSIS — G25.81 RESTLESS LEGS: ICD-10-CM

## 2021-10-01 DIAGNOSIS — N40.0 ENLARGED PROSTATE: ICD-10-CM

## 2021-10-01 DIAGNOSIS — Z00.00 ENCOUNTER FOR PREVENTATIVE ADULT HEALTH CARE EXAMINATION: ICD-10-CM

## 2021-10-01 PROCEDURE — 3079F DIAST BP 80-89 MM HG: CPT | Performed by: INTERNAL MEDICINE

## 2021-10-01 PROCEDURE — 80053 COMPREHEN METABOLIC PANEL: CPT

## 2021-10-01 PROCEDURE — 84443 ASSAY THYROID STIM HORMONE: CPT

## 2021-10-01 PROCEDURE — 99396 PREV VISIT EST AGE 40-64: CPT | Performed by: INTERNAL MEDICINE

## 2021-10-01 PROCEDURE — 3075F SYST BP GE 130 - 139MM HG: CPT | Performed by: INTERNAL MEDICINE

## 2021-10-01 PROCEDURE — 80061 LIPID PANEL: CPT

## 2021-10-01 PROCEDURE — 3008F BODY MASS INDEX DOCD: CPT | Performed by: INTERNAL MEDICINE

## 2021-10-01 PROCEDURE — 83036 HEMOGLOBIN GLYCOSYLATED A1C: CPT

## 2021-10-01 PROCEDURE — G0438 PPPS, INITIAL VISIT: HCPCS | Performed by: INTERNAL MEDICINE

## 2021-10-01 PROCEDURE — 85025 COMPLETE CBC W/AUTO DIFF WBC: CPT

## 2021-10-01 PROCEDURE — 90471 IMMUNIZATION ADMIN: CPT | Performed by: INTERNAL MEDICINE

## 2021-10-01 PROCEDURE — 90686 IIV4 VACC NO PRSV 0.5 ML IM: CPT | Performed by: INTERNAL MEDICINE

## 2021-10-01 PROCEDURE — 36415 COLL VENOUS BLD VENIPUNCTURE: CPT

## 2021-10-01 RX ORDER — HYDROCHLOROTHIAZIDE 25 MG/1
25 TABLET ORAL DAILY
Qty: 90 TABLET | Refills: 1 | Status: SHIPPED | OUTPATIENT
Start: 2021-10-01

## 2021-10-01 RX ORDER — METOPROLOL TARTRATE 100 MG/1
100 TABLET ORAL DAILY
Qty: 90 TABLET | Refills: 1 | Status: SHIPPED | OUTPATIENT
Start: 2021-10-01

## 2021-10-01 RX ORDER — AMLODIPINE BESYLATE 5 MG/1
5 TABLET ORAL DAILY
Qty: 90 TABLET | Refills: 1 | Status: SHIPPED | OUTPATIENT
Start: 2021-10-01

## 2021-10-01 RX ORDER — PANTOPRAZOLE SODIUM 20 MG/1
20 TABLET, DELAYED RELEASE ORAL
Qty: 30 TABLET | Refills: 2 | Status: SHIPPED | OUTPATIENT
Start: 2021-10-01 | End: 2021-12-27

## 2021-10-01 RX ORDER — ATORVASTATIN CALCIUM 10 MG/1
10 TABLET, FILM COATED ORAL NIGHTLY
Qty: 90 TABLET | Refills: 1 | Status: SHIPPED | OUTPATIENT
Start: 2021-10-01

## 2021-10-01 NOTE — PATIENT INSTRUCTIONS
- Repeat blood pressure reading is normal.  We will continue current medications  - You can take pantoprazole, prescription medication, for heartburn.   Take daily for 2-3 weeks, then take a break for a few weeks (this way it does not affect vitamin/nutrien

## 2021-10-01 NOTE — PROGRESS NOTES
Carlyn Cárdenas is a 61year old male. HPI:   Patient presents with:  Physical: Fasting; Patient presents for CPX/wellness examination. Diet: Reasonable diet, all home cooked meals. Exercise: Works two jobs - both very physical jobs.   Vision: No sharla EVERY EVENING, Disp: 90 tablet, Rfl: 0  •  Azelastine HCl 0.1 % Nasal Solution, 1 spray by Nasal route 2 (two) times daily. , Disp: 30 mL, Rfl: 1  •  Azelastine HCl 0.05 % Ophthalmic Solution, Place 1 drop into both eyes 2 (two) times daily. , Disp: 6 mL, Rf pain  PSYCH: pleasant, appropriate mood and affect  ASSESSMENT AND PLAN:   1. Encounter for preventative adult health examination  2. Screening for malignant neoplasm of prostate  3. Enlarged prostate  4. Screening for malignant neoplasm of colon  5.  Need breakfast.  Dispense: 30 tablet; Refill: 2    Patient Care Team:  Tyrell Brantley MD as PCP - General (Internal Medicine)  The patient indicates understanding of these issues and agrees to the plan.   The patient is asked to return to clinic in 12 months w

## 2021-11-09 ENCOUNTER — OFFICE VISIT (OUTPATIENT)
Dept: SURGERY | Facility: CLINIC | Age: 66
End: 2021-11-09

## 2021-11-09 DIAGNOSIS — R82.90 URINE FINDING: Primary | ICD-10-CM

## 2021-11-09 DIAGNOSIS — N40.0 ENLARGED PROSTATE: ICD-10-CM

## 2021-11-09 DIAGNOSIS — R39.9 LOWER URINARY TRACT SYMPTOMS: ICD-10-CM

## 2021-11-09 DIAGNOSIS — R35.0 FREQUENCY OF MICTURITION: ICD-10-CM

## 2021-11-09 PROCEDURE — 81003 URINALYSIS AUTO W/O SCOPE: CPT | Performed by: UROLOGY

## 2021-11-09 PROCEDURE — 99243 OFF/OP CNSLTJ NEW/EST LOW 30: CPT | Performed by: UROLOGY

## 2021-11-09 RX ORDER — TAMSULOSIN HYDROCHLORIDE 0.4 MG/1
0.4 CAPSULE ORAL DAILY
Qty: 30 CAPSULE | Refills: 2 | Status: SHIPPED | OUTPATIENT
Start: 2021-11-09 | End: 2021-12-09

## 2021-11-09 NOTE — PROGRESS NOTES
Rooming Clinician:     Beronicarichiejori Panda is a 61year old male. Patient presents with:  Consult: c/o weak stream and nocturia  BPH  Stream: weak  Daytime frequency: occasional   Straining to urinate: Yes  Empty after urination: No    Post void dribbling:  Yes (Patient not taking: Reported on 11/9/2021) 90 tablet 1   • triamcinolone acetonide 0.1 % External Cream Apply topically 2 (two) times daily as needed.  (Patient not taking: Reported on 11/9/2021) 45 g 1   • Diclofenac Sodium 75 MG Oral Tab EC Take 1 tablet 207.0 10/01/2021    CREATSERUM 1.07 10/01/2021    BUN 19 (H) 10/01/2021     10/01/2021    K 4.5 10/01/2021     10/01/2021    CO2 29.0 10/01/2021    GLU 98 10/01/2021    CA 9.3 10/01/2021    ALB 3.6 10/01/2021    ALKPHO 40 (L) 10/01/2021    AST

## 2021-11-09 NOTE — PATIENT INSTRUCTIONS
Tamsulosin HCl Oral Capsule 0.4 mg  Uses  This medicine is used for the following purposes:  · kidney stones  · prostate enlargement  Instructions  Swallow the medicine without crushing or chewing it.   Take the medicine 30 minutes after the same meal eac care.  Do not start or stop any other medicines without first speaking to your doctor or pharmacist.  If you have painful erection or an erection for more than 4 hours, seek medical care right away.   Do not share this medicine with anyone who has not been

## 2021-12-21 ENCOUNTER — OFFICE VISIT (OUTPATIENT)
Dept: SURGERY | Facility: CLINIC | Age: 66
End: 2021-12-21

## 2021-12-21 DIAGNOSIS — N40.0 ENLARGED PROSTATE: ICD-10-CM

## 2021-12-21 DIAGNOSIS — R82.90 URINE FINDING: Primary | ICD-10-CM

## 2021-12-21 DIAGNOSIS — R39.9 LOWER URINARY TRACT SYMPTOMS: ICD-10-CM

## 2021-12-21 PROCEDURE — 51741 ELECTRO-UROFLOWMETRY FIRST: CPT | Performed by: UROLOGY

## 2021-12-21 PROCEDURE — 81003 URINALYSIS AUTO W/O SCOPE: CPT | Performed by: UROLOGY

## 2021-12-21 PROCEDURE — 99213 OFFICE O/P EST LOW 20 MIN: CPT | Performed by: UROLOGY

## 2021-12-21 RX ORDER — TAMSULOSIN HYDROCHLORIDE 0.4 MG/1
0.4 CAPSULE ORAL DAILY
Qty: 90 CAPSULE | Refills: 3 | Status: SHIPPED | OUTPATIENT
Start: 2021-12-21 | End: 2022-03-21

## 2021-12-21 NOTE — PROGRESS NOTES
Rooming Clinician:     Len Preston is a 61year old male. Patient presents with: Follow - Up: Son in law here to interpret. Uroflow completed today.  Did not complete voiding diary        HPI:     Patient has a history of urinary urgency and frequency a Tachycardia      Past Surgical History:   Procedure Laterality Date   • APPENDECTOMY  1986      Social History:  Social History    Tobacco Use      Smoking status: Never Smoker      Smokeless tobacco: Never Used    Vaping Use      Vaping Use: Never used understanding of these issues and agrees to the plan. Olviia Mccarthy M.D.   Urology

## 2021-12-25 DIAGNOSIS — K21.9 GASTROESOPHAGEAL REFLUX DISEASE, UNSPECIFIED WHETHER ESOPHAGITIS PRESENT: ICD-10-CM

## 2021-12-25 DIAGNOSIS — G25.81 RESTLESS LEGS: ICD-10-CM

## 2021-12-25 DIAGNOSIS — R25.2 LEG CRAMPS: ICD-10-CM

## 2021-12-27 RX ORDER — ROPINIROLE 0.5 MG/1
0.5 TABLET, FILM COATED ORAL EVERY EVENING
Qty: 90 TABLET | Refills: 1 | Status: SHIPPED | OUTPATIENT
Start: 2021-12-27

## 2021-12-27 RX ORDER — PANTOPRAZOLE SODIUM 20 MG/1
20 TABLET, DELAYED RELEASE ORAL
Qty: 90 TABLET | Refills: 1 | Status: SHIPPED | OUTPATIENT
Start: 2021-12-27

## 2021-12-27 NOTE — TELEPHONE ENCOUNTER
No protocol     Last refill:  pantoprazole 20 MG Oral Tab EC 30 tablet 2 10/1/2021    Sig:   Take 1 tablet (20 mg total) by mouth every morning before breakfast.       ROPINIROLE HCL 0.5 MG Oral Tab 90 tablet 0 9/28/2021    Sig:   TAKE 1 TABLET(0.5 MG) BY

## 2022-01-05 ENCOUNTER — IMMUNIZATION (OUTPATIENT)
Dept: LAB | Facility: HOSPITAL | Age: 67
End: 2022-01-05
Attending: EMERGENCY MEDICINE
Payer: COMMERCIAL

## 2022-01-05 DIAGNOSIS — Z23 NEED FOR VACCINATION: Primary | ICD-10-CM

## 2022-01-05 PROCEDURE — 0004A SARSCOV2 VAC 30MCG/0.3ML IM: CPT | Performed by: NURSE PRACTITIONER

## 2022-02-10 RX ORDER — TAMSULOSIN HYDROCHLORIDE 0.4 MG/1
0.4 CAPSULE ORAL
Qty: 90 CAPSULE | Refills: 0 | Status: SHIPPED | OUTPATIENT
Start: 2022-02-10 | End: 2023-02-10

## 2022-03-16 RX ORDER — DICLOFENAC SODIUM 75 MG/1
75 TABLET, DELAYED RELEASE ORAL 2 TIMES DAILY PRN
Qty: 60 TABLET | Refills: 2 | Status: SHIPPED | OUTPATIENT
Start: 2022-03-16

## 2022-03-16 NOTE — TELEPHONE ENCOUNTER
LOV: 10/1/2021 with Dr. Mariposa Cassidy  RTC: 12 months  Last Relevant Labs: 10/1/2021   Filled: 12/6/2018    #60 with 2 refills    Future Appointments   Date Time Provider Jay Cruz   3/21/2022 12:00 PM Debora Blood MD [facility] [place]   6/21/2022 12:30 PM Judge Tristin MD [facility]

## 2022-03-22 NOTE — TELEPHONE ENCOUNTER
LOV: 10/1/2021 with Dr. Dany Smith   RTC: 12 months  Last Relevant Labs: 10/1/2021   Filled: 10/1/2021    #90 with 1 refill    Future Appointments   Date Time Provider Jay Cruz   6/21/2022 12:30 PM Noa Chapa MD [facility]

## 2022-03-23 RX ORDER — ATORVASTATIN CALCIUM 10 MG/1
10 TABLET, FILM COATED ORAL NIGHTLY
Qty: 90 TABLET | Refills: 1 | Status: SHIPPED | OUTPATIENT
Start: 2022-03-23

## 2022-03-29 NOTE — TELEPHONE ENCOUNTER
LOV: 10/1/2021 with Dr. Marika Farrar   RTC: 12 months  Last Relevant Labs: 10/1/2021   Filled: 10/1/2021    #90 with 1 refill    Future Appointments   Date Time Provider Jay Cruz   6/21/2022 12:30 PM Shayne Vines MD [facility]

## 2022-03-30 RX ORDER — METOPROLOL TARTRATE 100 MG/1
100 TABLET ORAL DAILY
Qty: 90 TABLET | Refills: 1 | Status: SHIPPED | OUTPATIENT
Start: 2022-03-30

## 2022-03-30 RX ORDER — HYDROCHLOROTHIAZIDE 25 MG/1
25 TABLET ORAL DAILY
Qty: 90 TABLET | Refills: 1 | Status: SHIPPED | OUTPATIENT
Start: 2022-03-30

## 2022-03-30 RX ORDER — AMLODIPINE BESYLATE 5 MG/1
5 TABLET ORAL DAILY
Qty: 90 TABLET | Refills: 1 | Status: SHIPPED | OUTPATIENT
Start: 2022-03-30

## 2022-06-09 DIAGNOSIS — G89.29 CHRONIC JOINT PAIN: ICD-10-CM

## 2022-06-09 DIAGNOSIS — M25.50 CHRONIC JOINT PAIN: ICD-10-CM

## 2022-06-09 RX ORDER — DICLOFENAC SODIUM 75 MG/1
75 TABLET, DELAYED RELEASE ORAL 2 TIMES DAILY PRN
Qty: 60 TABLET | Refills: 2 | Status: SHIPPED | OUTPATIENT
Start: 2022-06-09

## 2022-06-09 NOTE — TELEPHONE ENCOUNTER
No Protocol     Medication Requested:   diclofenac 75 MG Oral Tab EC  Filled: 03/16/22 #60 w/ 2 refills   LOV: 10/01/21 w/ Dr. Adrianna Lee   RTC: 10/01/22  FOV: not on file   Recent Labs: 10/01/21

## 2022-06-24 DIAGNOSIS — G25.81 RESTLESS LEGS: ICD-10-CM

## 2022-06-24 DIAGNOSIS — R25.2 LEG CRAMPS: ICD-10-CM

## 2022-06-24 DIAGNOSIS — K21.9 GASTROESOPHAGEAL REFLUX DISEASE, UNSPECIFIED WHETHER ESOPHAGITIS PRESENT: ICD-10-CM

## 2022-06-26 RX ORDER — PANTOPRAZOLE SODIUM 20 MG/1
20 TABLET, DELAYED RELEASE ORAL
Qty: 90 TABLET | Refills: 1 | Status: SHIPPED | OUTPATIENT
Start: 2022-06-26

## 2022-06-26 RX ORDER — ROPINIROLE 0.5 MG/1
0.5 TABLET, FILM COATED ORAL EVERY EVENING
Qty: 90 TABLET | Refills: 1 | Status: SHIPPED | OUTPATIENT
Start: 2022-06-26

## 2022-08-11 ENCOUNTER — OFFICE VISIT (OUTPATIENT)
Dept: SURGERY | Facility: CLINIC | Age: 67
End: 2022-08-11
Payer: COMMERCIAL

## 2022-08-11 DIAGNOSIS — R35.0 BENIGN PROSTATIC HYPERPLASIA WITH URINARY FREQUENCY: ICD-10-CM

## 2022-08-11 DIAGNOSIS — N40.1 BENIGN PROSTATIC HYPERPLASIA WITH URINARY FREQUENCY: ICD-10-CM

## 2022-08-11 DIAGNOSIS — R82.90 URINE FINDING: Primary | ICD-10-CM

## 2022-08-11 DIAGNOSIS — R39.9 LOWER URINARY TRACT SYMPTOMS: ICD-10-CM

## 2022-08-11 LAB
APPEARANCE: CLEAR
BILIRUBIN: NEGATIVE
GLUCOSE (URINE DIPSTICK): NEGATIVE MG/DL
KETONES (URINE DIPSTICK): NEGATIVE MG/DL
LEUKOCYTES: NEGATIVE
MULTISTIX LOT#: ABNORMAL NUMERIC
NITRITE, URINE: NEGATIVE
PH, URINE: 5.5 (ref 4.5–8)
PROTEIN (URINE DIPSTICK): NEGATIVE MG/DL
SPECIFIC GRAVITY: 1.01 (ref 1–1.03)
URINE-COLOR: YELLOW
UROBILINOGEN,SEMI-QN: 0.2 MG/DL (ref 0–1.9)

## 2022-08-11 PROCEDURE — 99213 OFFICE O/P EST LOW 20 MIN: CPT | Performed by: UROLOGY

## 2022-08-11 PROCEDURE — 81003 URINALYSIS AUTO W/O SCOPE: CPT | Performed by: UROLOGY

## 2022-08-11 PROCEDURE — 51798 US URINE CAPACITY MEASURE: CPT | Performed by: UROLOGY

## 2022-09-14 DIAGNOSIS — E78.2 MIXED HYPERLIPIDEMIA: ICD-10-CM

## 2022-09-14 RX ORDER — ATORVASTATIN CALCIUM 10 MG/1
10 TABLET, FILM COATED ORAL NIGHTLY
Qty: 90 TABLET | Refills: 1 | Status: SHIPPED | OUTPATIENT
Start: 2022-09-14

## 2022-09-14 NOTE — TELEPHONE ENCOUNTER
LOV: 10/1/2021 with Dr. Garland Christensen  RTC: 12 months  Last Relevant Labs: 10/1/2021  Filled: 3/23/2022     #90 with 1 refill    Future Appointments   Date Time Provider Jay Cruz   8/10/2023 11:45 AM Otilia Franz MD [facility]

## 2022-09-21 DIAGNOSIS — I10 ESSENTIAL HYPERTENSION: ICD-10-CM

## 2022-09-21 RX ORDER — METOPROLOL TARTRATE 100 MG/1
100 TABLET ORAL DAILY
Qty: 90 TABLET | Refills: 0 | Status: SHIPPED | OUTPATIENT
Start: 2022-09-21

## 2022-09-21 RX ORDER — AMLODIPINE BESYLATE 5 MG/1
5 TABLET ORAL DAILY
Qty: 90 TABLET | Refills: 0 | Status: SHIPPED | OUTPATIENT
Start: 2022-09-21

## 2022-09-21 RX ORDER — HYDROCHLOROTHIAZIDE 25 MG/1
25 TABLET ORAL DAILY
Qty: 90 TABLET | Refills: 0 | Status: SHIPPED | OUTPATIENT
Start: 2022-09-21

## 2022-10-17 ENCOUNTER — TELEPHONE (OUTPATIENT)
Dept: INTERNAL MEDICINE CLINIC | Facility: CLINIC | Age: 67
End: 2022-10-17

## 2022-10-17 DIAGNOSIS — E78.2 MIXED HYPERLIPIDEMIA: ICD-10-CM

## 2022-10-17 DIAGNOSIS — I10 ESSENTIAL HYPERTENSION: Primary | ICD-10-CM

## 2022-10-17 NOTE — TELEPHONE ENCOUNTER
Pt daughter requesting referral to Cardiologist due to patient health history/wants to est care. Requesting someone close to [facility].      Please advise

## 2022-10-17 NOTE — TELEPHONE ENCOUNTER
Cardiology referral entered for Dr. Alyssia Cat  [address] [facility]  [address] [facility], [address] [place] [address]  [phone]

## 2022-10-18 NOTE — TELEPHONE ENCOUNTER
Spoke with daughter, Laurita Calderon (on verbal). Gave contact info for Dr. Venecia Marlow and stated referral has been authorized. She v/u.

## 2022-11-22 ENCOUNTER — IMMUNIZATION (OUTPATIENT)
Dept: LAB | Age: 67
End: 2022-11-22
Attending: EMERGENCY MEDICINE
Payer: COMMERCIAL

## 2022-11-22 DIAGNOSIS — Z23 NEED FOR VACCINATION: Primary | ICD-10-CM

## 2022-11-22 PROCEDURE — 0124A SARSCOV2 VAC BVL 30MCG/0.3ML: CPT

## 2022-12-20 DIAGNOSIS — G25.81 RESTLESS LEGS: ICD-10-CM

## 2022-12-20 DIAGNOSIS — I10 ESSENTIAL HYPERTENSION: ICD-10-CM

## 2022-12-20 DIAGNOSIS — R25.2 LEG CRAMPS: ICD-10-CM

## 2022-12-21 RX ORDER — AMLODIPINE BESYLATE 5 MG/1
5 TABLET ORAL DAILY
Qty: 90 TABLET | Refills: 0 | Status: SHIPPED | OUTPATIENT
Start: 2022-12-21

## 2022-12-21 RX ORDER — ROPINIROLE 0.5 MG/1
0.5 TABLET, FILM COATED ORAL EVERY EVENING
Qty: 90 TABLET | Refills: 1 | Status: SHIPPED | OUTPATIENT
Start: 2022-12-21

## 2022-12-21 NOTE — TELEPHONE ENCOUNTER
Called and scheduled cpx with patients daughter for 1/6       Protocol failed   Amlodipine 5mg filled 9/21/22 # 90 0 refills     No Protocol   Ropinirole 0.5mg filled 6/26/22 # 90 1 refill       LOV: 10/1/21   RTC: 12 months   Recent Labs: 10/1/21     Upcoming OV: 1/6/23

## 2022-12-22 DIAGNOSIS — I10 ESSENTIAL HYPERTENSION: ICD-10-CM

## 2022-12-22 RX ORDER — HYDROCHLOROTHIAZIDE 25 MG/1
25 TABLET ORAL DAILY
Qty: 90 TABLET | Refills: 0 | Status: SHIPPED | OUTPATIENT
Start: 2022-12-22

## 2023-01-06 ENCOUNTER — LAB ENCOUNTER (OUTPATIENT)
Dept: LAB | Age: 68
End: 2023-01-06
Attending: INTERNAL MEDICINE
Payer: COMMERCIAL

## 2023-01-06 ENCOUNTER — OFFICE VISIT (OUTPATIENT)
Dept: INTERNAL MEDICINE CLINIC | Facility: CLINIC | Age: 68
End: 2023-01-06
Payer: COMMERCIAL

## 2023-01-06 VITALS
DIASTOLIC BLOOD PRESSURE: 80 MMHG | BODY MASS INDEX: 37.47 KG/M2 | WEIGHT: 227.63 LBS | OXYGEN SATURATION: 93 % | RESPIRATION RATE: 16 BRPM | HEART RATE: 53 BPM | HEIGHT: 65.16 IN | TEMPERATURE: 97 F | SYSTOLIC BLOOD PRESSURE: 138 MMHG

## 2023-01-06 DIAGNOSIS — R25.2 LEG CRAMPS: ICD-10-CM

## 2023-01-06 DIAGNOSIS — Z91.018 MULTIPLE FOOD ALLERGIES: ICD-10-CM

## 2023-01-06 DIAGNOSIS — E78.2 MIXED HYPERLIPIDEMIA: ICD-10-CM

## 2023-01-06 DIAGNOSIS — Z12.5 SCREENING FOR MALIGNANT NEOPLASM OF PROSTATE: ICD-10-CM

## 2023-01-06 DIAGNOSIS — G25.81 RESTLESS LEGS: ICD-10-CM

## 2023-01-06 DIAGNOSIS — Z00.00 ENCOUNTER FOR PREVENTATIVE ADULT HEALTH CARE EXAMINATION: ICD-10-CM

## 2023-01-06 DIAGNOSIS — Z23 NEED FOR PNEUMOCOCCAL VACCINATION: ICD-10-CM

## 2023-01-06 DIAGNOSIS — Z00.00 ENCOUNTER FOR PREVENTATIVE ADULT HEALTH CARE EXAMINATION: Primary | ICD-10-CM

## 2023-01-06 DIAGNOSIS — I10 PRIMARY HYPERTENSION: ICD-10-CM

## 2023-01-06 DIAGNOSIS — Z23 NEED FOR SHINGLES VACCINE: ICD-10-CM

## 2023-01-06 DIAGNOSIS — Z12.11 SCREENING FOR MALIGNANT NEOPLASM OF COLON: ICD-10-CM

## 2023-01-06 LAB
ALBUMIN SERPL-MCNC: 3.9 G/DL (ref 3.4–5)
ALBUMIN/GLOB SERPL: 1.2 {RATIO} (ref 1–2)
ALP LIVER SERPL-CCNC: 46 U/L
ALT SERPL-CCNC: 34 U/L
ANION GAP SERPL CALC-SCNC: 1 MMOL/L (ref 0–18)
AST SERPL-CCNC: 23 U/L (ref 15–37)
BASOPHILS # BLD AUTO: 0.07 X10(3) UL (ref 0–0.2)
BASOPHILS NFR BLD AUTO: 1 %
BILIRUB SERPL-MCNC: 0.4 MG/DL (ref 0.1–2)
BUN BLD-MCNC: 14 MG/DL (ref 7–18)
CALCIUM BLD-MCNC: 9 MG/DL (ref 8.5–10.1)
CHLORIDE SERPL-SCNC: 104 MMOL/L (ref 98–112)
CHOLEST SERPL-MCNC: 176 MG/DL (ref ?–200)
CO2 SERPL-SCNC: 29 MMOL/L (ref 21–32)
COMPLEXED PSA SERPL-MCNC: 0.46 NG/ML (ref ?–4)
CREAT BLD-MCNC: 1 MG/DL
EOSINOPHIL # BLD AUTO: 0.35 X10(3) UL (ref 0–0.7)
EOSINOPHIL NFR BLD AUTO: 4.8 %
ERYTHROCYTE [DISTWIDTH] IN BLOOD BY AUTOMATED COUNT: 12.5 %
EST. AVERAGE GLUCOSE BLD GHB EST-MCNC: 114 MG/DL (ref 68–126)
FASTING PATIENT LIPID ANSWER: YES
FASTING STATUS PATIENT QL REPORTED: YES
GFR SERPLBLD BASED ON 1.73 SQ M-ARVRAT: 84 ML/MIN/1.73M2 (ref 60–?)
GLOBULIN PLAS-MCNC: 3.2 G/DL (ref 2.8–4.4)
GLUCOSE BLD-MCNC: 102 MG/DL (ref 70–99)
HBA1C MFR BLD: 5.6 % (ref ?–5.7)
HCT VFR BLD AUTO: 43.2 %
HDLC SERPL-MCNC: 41 MG/DL (ref 40–59)
HGB BLD-MCNC: 15 G/DL
IMM GRANULOCYTES # BLD AUTO: 0.02 X10(3) UL (ref 0–1)
IMM GRANULOCYTES NFR BLD: 0.3 %
LDLC SERPL CALC-MCNC: 102 MG/DL (ref ?–100)
LYMPHOCYTES # BLD AUTO: 2.9 X10(3) UL (ref 1–4)
LYMPHOCYTES NFR BLD AUTO: 39.8 %
MCH RBC QN AUTO: 33 PG (ref 26–34)
MCHC RBC AUTO-ENTMCNC: 34.7 G/DL (ref 31–37)
MCV RBC AUTO: 95.2 FL
MONOCYTES # BLD AUTO: 0.82 X10(3) UL (ref 0.1–1)
MONOCYTES NFR BLD AUTO: 11.2 %
NEUTROPHILS # BLD AUTO: 3.13 X10 (3) UL (ref 1.5–7.7)
NEUTROPHILS # BLD AUTO: 3.13 X10(3) UL (ref 1.5–7.7)
NEUTROPHILS NFR BLD AUTO: 42.9 %
NONHDLC SERPL-MCNC: 135 MG/DL (ref ?–130)
OSMOLALITY SERPL CALC.SUM OF ELEC: 279 MOSM/KG (ref 275–295)
PLATELET # BLD AUTO: 192 10(3)UL (ref 150–450)
POTASSIUM SERPL-SCNC: 4.3 MMOL/L (ref 3.5–5.1)
PROT SERPL-MCNC: 7.1 G/DL (ref 6.4–8.2)
RBC # BLD AUTO: 4.54 X10(6)UL
SODIUM SERPL-SCNC: 134 MMOL/L (ref 136–145)
TRIGL SERPL-MCNC: 188 MG/DL (ref 30–149)
VLDLC SERPL CALC-MCNC: 32 MG/DL (ref 0–30)
WBC # BLD AUTO: 7.3 X10(3) UL (ref 4–11)

## 2023-01-06 PROCEDURE — 3079F DIAST BP 80-89 MM HG: CPT | Performed by: INTERNAL MEDICINE

## 2023-01-06 PROCEDURE — 82785 ASSAY OF IGE: CPT

## 2023-01-06 PROCEDURE — 85025 COMPLETE CBC W/AUTO DIFF WBC: CPT

## 2023-01-06 PROCEDURE — 80061 LIPID PANEL: CPT

## 2023-01-06 PROCEDURE — 90471 IMMUNIZATION ADMIN: CPT | Performed by: INTERNAL MEDICINE

## 2023-01-06 PROCEDURE — 99397 PER PM REEVAL EST PAT 65+ YR: CPT | Performed by: INTERNAL MEDICINE

## 2023-01-06 PROCEDURE — 90472 IMMUNIZATION ADMIN EACH ADD: CPT | Performed by: INTERNAL MEDICINE

## 2023-01-06 PROCEDURE — 83036 HEMOGLOBIN GLYCOSYLATED A1C: CPT

## 2023-01-06 PROCEDURE — 80053 COMPREHEN METABOLIC PANEL: CPT

## 2023-01-06 PROCEDURE — 90750 HZV VACC RECOMBINANT IM: CPT | Performed by: INTERNAL MEDICINE

## 2023-01-06 PROCEDURE — 36415 COLL VENOUS BLD VENIPUNCTURE: CPT

## 2023-01-06 PROCEDURE — 90677 PCV20 VACCINE IM: CPT | Performed by: INTERNAL MEDICINE

## 2023-01-06 PROCEDURE — 3075F SYST BP GE 130 - 139MM HG: CPT | Performed by: INTERNAL MEDICINE

## 2023-01-06 PROCEDURE — 86003 ALLG SPEC IGE CRUDE XTRC EA: CPT

## 2023-01-06 PROCEDURE — 3008F BODY MASS INDEX DOCD: CPT | Performed by: INTERNAL MEDICINE

## 2023-01-06 RX ORDER — METOPROLOL SUCCINATE 50 MG/1
50 TABLET, EXTENDED RELEASE ORAL DAILY
COMMUNITY
Start: 2022-11-01

## 2023-01-06 RX ORDER — PRAMIPEXOLE DIHYDROCHLORIDE 0.25 MG/1
0.25 TABLET ORAL NIGHTLY
Qty: 90 TABLET | Refills: 0 | Status: SHIPPED | OUTPATIENT
Start: 2023-01-06

## 2023-01-06 NOTE — PATIENT INSTRUCTIONS
- Get blood tests done today  - Pneumonia shot, first shingles shot given today. You can call our office to schedule a nurse visit for second shingles shot in 2-6 months. - We will try a different medication, pramipexole, for restless leg/leg cramps. Take 1 tablet nightly (2-3 hours before bedtime)  - Follow up with [address] [place] [address] for colonoscopy:  [address]  [facility], [address]  ([address])  Phone: [phone] [address] [address], [address]  (Between [address])  Phone: [phone]  - Follow up in 1 year for physical/chronic issues; follow up earlier as needed. It was a pleasure seeing you in the clinic today. Thank you for choosing the [facility] office for your healthcare needs. Please call at [phone] with any questions or concerns.     Jagjit Green MD

## 2023-01-09 LAB
CLAM IGE QN: <0.1 KUA/L (ref ?–0.1)
CODFISH IGE QN: <0.1 KUA/L (ref ?–0.1)
CORN IGE QN: <0.1 KUA/L (ref ?–0.1)
COW MILK IGE QN: <0.1 KUA/L (ref ?–0.1)
EGG WHITE IGE QN: <0.1 KUA/L (ref ?–0.1)
IGE SERPL-ACNC: 26.5 KU/L (ref 2–214)
PEANUT IGE QN: <0.1 KUA/L (ref ?–0.1)
SCALLOP IGE QN: <0.1 KUA/L (ref ?–0.1)
SESAME SEED IGE QN: <0.1 KUA/L (ref ?–0.1)
SHRIMP IGE QN: <0.1 KUA/L (ref ?–0.1)
SOYBEAN IGE QN: <0.1 KUA/L (ref ?–0.1)
WALNUT IGE QN: <0.1 KUA/L (ref ?–0.1)
WHEAT IGE QN: <0.1 KUA/L (ref ?–0.1)

## 2023-01-10 DIAGNOSIS — Z91.018 MULTIPLE FOOD ALLERGIES: Primary | ICD-10-CM

## 2023-01-30 ENCOUNTER — TELEPHONE (OUTPATIENT)
Dept: SURGERY | Facility: CLINIC | Age: 68
End: 2023-01-30

## 2023-02-10 RX ORDER — TAMSULOSIN HYDROCHLORIDE 0.4 MG/1
0.4 CAPSULE ORAL
Qty: 90 CAPSULE | Refills: 2 | Status: SHIPPED | OUTPATIENT
Start: 2023-02-10 | End: 2023-02-10

## 2023-02-10 RX ORDER — TAMSULOSIN HYDROCHLORIDE 0.4 MG/1
0.4 CAPSULE ORAL
Qty: 90 CAPSULE | Refills: 2 | Status: SHIPPED | OUTPATIENT
Start: 2023-02-10

## 2023-03-13 PROBLEM — K57.30 DIVERTICULOSIS OF COLON: Status: ACTIVE | Noted: 2023-03-13

## 2023-03-13 PROBLEM — D12.2 BENIGN NEOPLASM OF ASCENDING COLON: Status: ACTIVE | Noted: 2023-03-13

## 2023-03-13 PROBLEM — K64.8 INTERNAL HEMORRHOIDS: Status: ACTIVE | Noted: 2023-03-13

## 2023-03-13 PROBLEM — Z12.11 SPECIAL SCREENING FOR MALIGNANT NEOPLASM OF COLON: Status: ACTIVE | Noted: 2023-03-13

## 2023-03-13 PROCEDURE — 88305 TISSUE EXAM BY PATHOLOGIST: CPT | Performed by: INTERNAL MEDICINE

## 2023-03-21 DIAGNOSIS — I10 ESSENTIAL HYPERTENSION: ICD-10-CM

## 2023-03-21 RX ORDER — AMLODIPINE BESYLATE 5 MG/1
5 TABLET ORAL DAILY
Qty: 90 TABLET | Refills: 0 | Status: SHIPPED | OUTPATIENT
Start: 2023-03-21

## 2023-03-21 RX ORDER — HYDROCHLOROTHIAZIDE 25 MG/1
25 TABLET ORAL DAILY
Qty: 90 TABLET | Refills: 0 | Status: SHIPPED | OUTPATIENT
Start: 2023-03-21

## 2023-04-05 DIAGNOSIS — E78.2 MIXED HYPERLIPIDEMIA: ICD-10-CM

## 2023-04-05 RX ORDER — ATORVASTATIN CALCIUM 10 MG/1
10 TABLET, FILM COATED ORAL NIGHTLY
Qty: 90 TABLET | Refills: 1 | Status: SHIPPED | OUTPATIENT
Start: 2023-04-05

## 2023-04-20 ENCOUNTER — TELEPHONE (OUTPATIENT)
Dept: INTERNAL MEDICINE CLINIC | Facility: CLINIC | Age: 68
End: 2023-04-20

## 2023-04-20 DIAGNOSIS — R25.2 LEG CRAMPS: ICD-10-CM

## 2023-04-20 DIAGNOSIS — G25.81 RESTLESS LEGS: ICD-10-CM

## 2023-04-20 NOTE — TELEPHONE ENCOUNTER
Incoming fax from [place] for prescription renewal form     Placed in covering provider in-basket Dr Morales Pod

## 2023-04-21 RX ORDER — PRAMIPEXOLE DIHYDROCHLORIDE 0.25 MG/1
0.25 TABLET ORAL NIGHTLY
Qty: 90 TABLET | Refills: 0 | Status: SHIPPED | OUTPATIENT
Start: 2023-04-21

## 2023-06-19 DIAGNOSIS — I10 ESSENTIAL HYPERTENSION: ICD-10-CM

## 2023-06-19 RX ORDER — ATORVASTATIN CALCIUM 20 MG/1
20 TABLET, FILM COATED ORAL DAILY
COMMUNITY
Start: 2023-04-28

## 2023-06-19 RX ORDER — HYDROCHLOROTHIAZIDE 25 MG/1
25 TABLET ORAL DAILY
Qty: 90 TABLET | Refills: 0 | Status: SHIPPED | OUTPATIENT
Start: 2023-06-19

## 2023-06-19 RX ORDER — AMLODIPINE BESYLATE 5 MG/1
5 TABLET ORAL DAILY
Qty: 90 TABLET | Refills: 0 | Status: SHIPPED | OUTPATIENT
Start: 2023-06-19

## 2023-06-27 ENCOUNTER — LAB ENCOUNTER (OUTPATIENT)
Dept: LAB | Facility: HOSPITAL | Age: 68
End: 2023-06-27
Attending: INTERNAL MEDICINE
Payer: COMMERCIAL

## 2023-06-27 DIAGNOSIS — E78.5 HYPERLIPIDEMIA: Primary | ICD-10-CM

## 2023-06-27 LAB
CHOLEST SERPL-MCNC: 138 MG/DL (ref ?–200)
FASTING PATIENT LIPID ANSWER: YES
HDLC SERPL-MCNC: 41 MG/DL (ref 40–59)
LDLC SERPL CALC-MCNC: 68 MG/DL (ref ?–100)
NONHDLC SERPL-MCNC: 97 MG/DL (ref ?–130)
TRIGL SERPL-MCNC: 171 MG/DL (ref 30–149)
VLDLC SERPL CALC-MCNC: 26 MG/DL (ref 0–30)

## 2023-06-27 PROCEDURE — 80061 LIPID PANEL: CPT

## 2023-06-27 PROCEDURE — 36415 COLL VENOUS BLD VENIPUNCTURE: CPT

## 2023-07-18 ENCOUNTER — TELEPHONE (OUTPATIENT)
Dept: SURGERY | Facility: CLINIC | Age: 68
End: 2023-07-18

## 2023-07-18 RX ORDER — TAMSULOSIN HYDROCHLORIDE 0.4 MG/1
0.4 CAPSULE ORAL
Qty: 90 CAPSULE | Refills: 0 | Status: SHIPPED | OUTPATIENT
Start: 2023-07-18

## 2023-07-18 NOTE — TELEPHONE ENCOUNTER
RN approved the refill request of Tamsulosin and faxed it to [facility]. Last seen by Dr Ida Harrison on 8/11/22    Benign Prostatic Hypertrophy Medications      Protocol Criteria:  Appointment scheduled in the past 12 months or in the next 2 months  If patients is between the ages of 48 and 79 then PSA done within the last 2 years and is either  Less than or equal to 4.0 ng/ml  Or if greater than 4.0 there is no significant increase (>0.5 ng/ml) in PSA over the last 2 years    Recent Outpatient Visits              6 months ago Encounter for preventative adult health care examination    Sally Pablo MD    Office Visit    11 months ago Urine finding    [address], Jackie Reynolds MD    Office Visit    1 year ago Urine finding    [address], [address] Zachary Justice MD    Office Visit    1 year ago Urine finding    [address], [address] Zachary Justice MD    Office Visit    1 year ago Encounter for preventative adult health care examination    Sally Pablo MD    Office Visit          Future Appointments         Provider Department Appt Notes    In 3 weeks Lamar Anaya MD [facility] Follow up.  Patient of Dr Ida Harrison                PSA:  No results found for: PSA    PSA Screen:    Lab Results   Component Value Date    PSAS 0.46 01/06/2023    PSAS 0.53 10/01/2021    PSAS 0.38 07/23/2020    PSAS 0.36 07/05/2018

## 2023-07-21 ENCOUNTER — TELEPHONE (OUTPATIENT)
Dept: SURGERY | Facility: CLINIC | Age: 68
End: 2023-07-21

## 2023-09-18 DIAGNOSIS — I10 ESSENTIAL HYPERTENSION: ICD-10-CM

## 2023-09-18 RX ORDER — AMLODIPINE BESYLATE 5 MG/1
5 TABLET ORAL DAILY
Qty: 90 TABLET | Refills: 1 | Status: SHIPPED | OUTPATIENT
Start: 2023-09-18

## 2023-09-18 RX ORDER — HYDROCHLOROTHIAZIDE 25 MG/1
25 TABLET ORAL DAILY
Qty: 90 TABLET | Refills: 1 | Status: SHIPPED | OUTPATIENT
Start: 2023-09-18

## 2023-10-13 ENCOUNTER — HOSPITAL ENCOUNTER (OUTPATIENT)
Dept: GENERAL RADIOLOGY | Age: 68
Discharge: HOME | End: 2023-10-13
Attending: INTERNAL MEDICINE
Payer: COMMERCIAL

## 2023-10-13 ENCOUNTER — OFFICE VISIT (OUTPATIENT)
Dept: INTERNAL MEDICINE CLINIC | Facility: CLINIC | Age: 68
End: 2023-10-13
Payer: COMMERCIAL

## 2023-10-13 VITALS
WEIGHT: 224 LBS | BODY MASS INDEX: 34 KG/M2 | DIASTOLIC BLOOD PRESSURE: 82 MMHG | HEART RATE: 56 BPM | SYSTOLIC BLOOD PRESSURE: 116 MMHG | OXYGEN SATURATION: 98 % | TEMPERATURE: 97 F

## 2023-10-13 DIAGNOSIS — N40.1 BPH WITH OBSTRUCTION/LOWER URINARY TRACT SYMPTOMS: Primary | ICD-10-CM

## 2023-10-13 DIAGNOSIS — N13.8 BPH WITH OBSTRUCTION/LOWER URINARY TRACT SYMPTOMS: Primary | ICD-10-CM

## 2023-10-13 DIAGNOSIS — M25.561 ACUTE PAIN OF RIGHT KNEE: ICD-10-CM

## 2023-10-13 DIAGNOSIS — Z23 NEED FOR SHINGLES VACCINE: ICD-10-CM

## 2023-10-13 PROCEDURE — 90750 HZV VACC RECOMBINANT IM: CPT | Performed by: INTERNAL MEDICINE

## 2023-10-13 PROCEDURE — 90471 IMMUNIZATION ADMIN: CPT | Performed by: INTERNAL MEDICINE

## 2023-10-13 PROCEDURE — 3074F SYST BP LT 130 MM HG: CPT | Performed by: INTERNAL MEDICINE

## 2023-10-13 PROCEDURE — 99214 OFFICE O/P EST MOD 30 MIN: CPT | Performed by: INTERNAL MEDICINE

## 2023-10-13 PROCEDURE — 73562 X-RAY EXAM OF KNEE 3: CPT | Performed by: INTERNAL MEDICINE

## 2023-10-13 PROCEDURE — 3079F DIAST BP 80-89 MM HG: CPT | Performed by: INTERNAL MEDICINE

## 2023-10-13 RX ORDER — MELOXICAM 7.5 MG/1
7.5 TABLET ORAL DAILY PRN
Qty: 30 TABLET | Refills: 2 | Status: SHIPPED | OUTPATIENT
Start: 2023-10-13

## 2023-10-13 NOTE — PATIENT INSTRUCTIONS
- Get x-ray of knee done. You can see if they can do the x-ray today, otherwise can schedule through [facility] or call central scheduling at [phone].  - Start prescription anti-inflammatory, meloxicam.  Take 1 tablet nightly with dinner  - If x-ray is normal and symptoms do not improve we may check an MRI of the knee  - Follow up with Dr. Jolly Turner partners at the Urology clinic:  Dr. Cuba Soliz, Dr. Kelley Mistry, or Dr. Nic Brooks [address], [address]  [phone]  - Second shingles shot given today. You do not need any more shingles shots after this. - Follow up with me in January for physical/chronic issues; follow up earlier as needed. It was a pleasure seeing you in the clinic today. Thank you for choosing the [facility] office for your healthcare needs. Please call at [phone] with any questions or concerns.     Daniel Davis MD

## 2023-10-14 PROBLEM — M17.11 PRIMARY OSTEOARTHRITIS OF RIGHT KNEE: Chronic | Status: ACTIVE | Noted: 2023-10-14

## 2023-10-14 PROBLEM — M17.11 PRIMARY OSTEOARTHRITIS OF RIGHT KNEE: Status: ACTIVE | Noted: 2023-10-14

## 2023-10-27 ENCOUNTER — PATIENT MESSAGE (OUTPATIENT)
Dept: INTERNAL MEDICINE CLINIC | Facility: CLINIC | Age: 68
End: 2023-10-27

## 2023-10-27 NOTE — TELEPHONE ENCOUNTER
Spoke to Viridiana velasco, on verbal release. Patient taking Meloxicam for knee pain 10/13. Dtr noticed patient has swelling below b/l eyes x 10 days. Began after starting Meloxicam. Also with some cold symptoms and throat irritation x 10 days. Patient told dtr symptoms are related to his cold. Patient allergic to ASA and was told by pharmacy that Meloxicam contained ASA but decided to take it. Rx helping knee pain and is still taking, also taking daily Claritin. Denies rash, itching, chest pain, breathing difficulties, swelling in throat/mouth. Advised dtr to discontinue Meloxicam and monitor for worsening symptoms. Advised to proceed to ER with chest pain, shortness of breath, swelling of throat/mouth. Verbalized understanding. Dtr wants to know if any alternative medications can be taken for knee pain? Please advise?     (Aware that RP returns on Monday)

## 2023-10-29 NOTE — TELEPHONE ENCOUNTER
Could try Tylenol/acetaminophen. Another option would be over the counter diclofenac/Voltaren gel - this is somewhat similar to aspirin but as it is gel form instead of pill sometimes side effects are less.

## 2023-11-17 ENCOUNTER — PATIENT MESSAGE (OUTPATIENT)
Dept: INTERNAL MEDICINE CLINIC | Facility: CLINIC | Age: 68
End: 2023-11-17

## 2023-12-01 ENCOUNTER — TELEPHONE (OUTPATIENT)
Dept: INTERNAL MEDICINE CLINIC | Facility: CLINIC | Age: 68
End: 2023-12-01

## 2023-12-01 ENCOUNTER — TELEMEDICINE (OUTPATIENT)
Dept: INTERNAL MEDICINE CLINIC | Facility: CLINIC | Age: 68
End: 2023-12-01
Payer: COMMERCIAL

## 2023-12-01 DIAGNOSIS — J01.90 ACUTE RHINOSINUSITIS: ICD-10-CM

## 2023-12-01 DIAGNOSIS — U07.1 COVID-19: Primary | ICD-10-CM

## 2023-12-01 LAB — AMB EXT COVID-19 RESULT: DETECTED

## 2023-12-01 PROCEDURE — 99213 OFFICE O/P EST LOW 20 MIN: CPT | Performed by: INTERNAL MEDICINE

## 2023-12-01 RX ORDER — AMOXICILLIN AND CLAVULANATE POTASSIUM 875; 125 MG/1; MG/1
1 TABLET, FILM COATED ORAL 2 TIMES DAILY
Qty: 14 TABLET | Refills: 0 | Status: SHIPPED | OUTPATIENT
Start: 2023-12-01 | End: 2023-12-08

## 2023-12-01 RX ORDER — TAMSULOSIN HYDROCHLORIDE 0.4 MG/1
CAPSULE ORAL
Qty: 90 CAPSULE | Refills: 0 | Status: SHIPPED | OUTPATIENT
Start: 2023-12-01

## 2023-12-01 RX ORDER — CODEINE PHOSPHATE AND GUAIFENESIN 10; 100 MG/5ML; MG/5ML
5 SOLUTION ORAL NIGHTLY PRN
Qty: 118 ML | Refills: 0 | Status: SHIPPED | OUTPATIENT
Start: 2023-12-01

## 2023-12-01 NOTE — TELEPHONE ENCOUNTER
I spoke with pts daughter. She states that pt has had congestion with ST and cough for approximately 10 day. The cough causes SOB while coughing, but not at rest or with activity and increases when lying down or at [facility]. Denies fever, body aches, chills, recent exposure to Covid. Daughter informed that we will need to see at an home negative Covid test and we can schedule him at 1 pm.     Daughter verbalized understanding and will call us if test comes back positive because if positive we may need to swith visit to [facility].      Future Appointments   Date Time Provider Jay Cruz   12/1/2023  1:00 PM Debora Blood MD [facility] [place]   12/22/2023 11:00 AM Edgar Hong MD [facility]

## 2023-12-01 NOTE — PROGRESS NOTES
Ludivina Ordonez is a 79year old male here today for a telemedicine/video visit. Patient is in the state of [place] during this visit. Ludivina Ordonez verbally consents to a Video visit service on 12/01/23. Patient understands and accepts financial responsibility for any deductible, co-insurance and/or co-pays associated with this service. Duration of the service: 5 minutes  Start time: 1:00 PM  End time: 1:05 PM    HPI:  Patient presents for follow up on acute symptoms. Daughter on video call with him. Symptoms started with a little bit of cough about 10 days ago. Now still with persistent cough, congestion, chest pressure. Hard to sleep at night due to cough. Occasional fevers, sore throat as well. Initially his at home COVID test was negative; however he took another home test today and it was positive. No shortness of breath. Wife had URI symptoms for a few weeks, she is on antibiotics. Past medical, surgical, family, and social histories were reviewed and are unchanged from previous visit. ROS:  GENERAL: fevers  SKIN: denies any unusual skin lesions  EYES: denies blurred vision or double vision  HEENT: sore throat, nasal congestion, sinus pain/pressure  LUNGS: cough; denies shortness of breath with exertion  CARDIOVASCULAR: denies chest pain on exertion  GI: denies abdominal pain, denies heartburn, denies diarrhea  MUSCULOSKELETAL: denies acute back pain, denies body aches  NEURO: denies headaches    EXAM:  GENERAL: Alert and oriented, well developed, well nourished,in no apparent distress  SKIN: no rashes,no suspicious lesions of face  HEENT: atraumatic, EOMI, normal lid and conjunctiva  NECK: no grossly visible jvd or thyromegaly  LUNGS: speaking in full sentences, no increased work of breathing, no audible wheezing  NEURO: alert and oriented x 3  PSYCH: pleasant, appropriate mood and affect    ASSESSMENT/PLAN:  1. COVID-19  2. Acute rhinosinusitis  Patient with URI symptoms for 10 days. Initially with cough, which has persisted. Now with fevers, sore throat, nasal/sinus congestion. Tested positive for COVID today. However as he has had symptoms for 10 days outside Paxlovid window. Suspect superimposed rhinosinusitis. Will start on Augmentin, qhs guaifenesin-codeine syrup. Advised patient/family to contact us if symptoms not better within 1 week/after finishing antibiotics; may order chest x-ray at that point. Advised to go to emergency department with any significant shortness of breath/respiratory symptoms. - amoxicillin clavulanate 875-125 MG Oral Tab; Take 1 tablet by mouth 2 (two) times daily for 7 days. Dispense: 14 tablet; Refill: 0  - guaiFENesin-codeine 100-10 MG/5ML Oral Solution; Take 5 mL by mouth nightly as needed for cough or congestion. Dispense: 118 mL; Refill: 0    Return to clinic in 1-2 months for follow up on chronic issues/CPX, or earlier as needed for acute issues. Please note that the following visit was completed using two-way, real-time interactive audio and video communication. This has been done in good jia to provide continuity of care in the best interest of the provider-patient relationship. There are limitations of this visit as a complete head to toe physical exam could not be performed. Every conscious effort was taken to allow for sufficient and adequate time. This billing was spent on reviewing labs, medications, radiology tests and decision making. Appropriate medical decision-making and tests are ordered as detailed in the plan of care above.

## 2023-12-01 NOTE — TELEPHONE ENCOUNTER
Can we reach out to patient/daughter Jeewll Meyerssloane, got a page from daughter stating patient dealing with URI symptoms.   They should do a home COVID test (if they have not already), if it is negative we can add him on at 1:00 today for office visit  Jewell Meyerssloane  [phone]

## 2023-12-11 RX ORDER — TAMSULOSIN HYDROCHLORIDE 0.4 MG/1
CAPSULE ORAL
Qty: 180 CAPSULE | Refills: 0 | OUTPATIENT
Start: 2023-12-11

## 2023-12-22 ENCOUNTER — OFFICE VISIT (OUTPATIENT)
Dept: SURGERY | Facility: CLINIC | Age: 68
End: 2023-12-22
Payer: COMMERCIAL

## 2023-12-22 DIAGNOSIS — Z12.5 SCREENING PSA (PROSTATE SPECIFIC ANTIGEN): ICD-10-CM

## 2023-12-22 DIAGNOSIS — R82.90 URINE FINDING: Primary | ICD-10-CM

## 2023-12-22 PROCEDURE — 81003 URINALYSIS AUTO W/O SCOPE: CPT | Performed by: SURGERY

## 2023-12-22 PROCEDURE — 99214 OFFICE O/P EST MOD 30 MIN: CPT | Performed by: SURGERY

## 2023-12-22 RX ORDER — TAMSULOSIN HYDROCHLORIDE 0.4 MG/1
0.4 CAPSULE ORAL DAILY
Qty: 90 CAPSULE | Refills: 5 | Status: SHIPPED | OUTPATIENT
Start: 2023-12-22

## 2023-12-22 NOTE — PROGRESS NOTES
Urology Clinic Note    Primary Care Provider:  Светлана Somers MD     Chief Complaint:   BPH/LUTS    HPI:   Riana Almeida is a 79year old male with history of hypertension, hyperlipidemia, BPH/LUTS previously seen by Dr. Miranda Arana last on 8/11/2022. At that time he was satisfied with his voiding symptoms on tamsulosin 0.4 mg daily. He is here with his son-in-law today who is translating for us. He is doing well at this time. He has a strong urinary stream.  Nocturia only once per night. He is satisfied with his voiding symptoms at this time on tamsulosin. His most recent PSA was 0.46 on 1/6/2023. AUA symptom score is 6/mixed with LUTS. Urinalysis: Small blood, otherwise negative    PSA:  Lab Results   Component Value Date    PSAS 0.46 01/06/2023    PSAS 0.53 10/01/2021    PSAS 0.38 07/23/2020    PSAS 0.36 07/05/2018        History:     Past Medical History:   Diagnosis Date    Allergic rhinitis 06/2021    Essential hypertension     High cholesterol     Hyperlipidemia     Obesity     Tachycardia        Past Surgical History:   Procedure Laterality Date    APPENDECTOMY  1986       Family History   Problem Relation Age of Onset    Cancer Father         Throat    Other (Other) Mother         Cirrhosis (not due to alcoholism)       Social History     Socioeconomic History    Marital status:    Tobacco Use    Smoking status: Never    Smokeless tobacco: Never   Vaping Use    Vaping Use: Never used   Substance and Sexual Activity    Alcohol use:  Yes     Alcohol/week: 6.0 standard drinks of alcohol     Types: 2 Glasses of wine, 4 Shots of liquor per week     Comment: 3 drinks / week    Drug use: No   Other Topics Concern    Caffeine Concern No    Exercise Yes    Seat Belt Yes    Special Diet No    Stress Concern No    Weight Concern Yes       Medications (Active prior to today's visit):  Current Outpatient Medications   Medication Sig Dispense Refill    TAMSULOSIN 0.4 MG Oral Cap TAKE 1 CAPSULE BY MOUTH AFTER BREAKFAST AND 30 MINUTES AFTER A MEAL 90 capsule 0    guaiFENesin-codeine 100-10 MG/5ML Oral Solution Take 5 mL by mouth nightly as needed for cough or congestion. 118 mL 0    diclofenac 50 MG Oral Tab EC Take 1 tablet (50 mg total) by mouth 3 (three) times daily as needed. 45 tablet 3    hydroCHLOROthiazide 25 MG Oral Tab Take 1 tablet (25 mg total) by mouth daily. 90 tablet 1    amLODIPine 5 MG Oral Tab Take 1 tablet (5 mg total) by mouth daily. 90 tablet 1    atorvastatin 20 MG Oral Tab Take 1 tablet (20 mg total) by mouth daily. pramipexole 0.25 MG Oral Tab Take 1 tablet (0.25 mg total) by mouth at bedtime. For restless legs 90 tablet 0    metoprolol succinate ER 50 MG Oral Tablet 24 Hr Take 1 tablet (50 mg total) by mouth daily. HYDROXYCHLOROQUINE SULFATE OR Take 1 tablet by mouth as needed. For cramping in legs - per patient      pantoprazole 20 MG Oral Tab EC Take 1 tablet (20 mg total) by mouth before breakfast. 90 tablet 1       Allergies: Allergies   Allergen Reactions    Meloxicam OTHER (SEE COMMENTS)     \"Lower Eye Swelling\"    Aspirin RASH    Seasonal        Review of Systems:   A comprehensive 10-point review of systems was completed. Pertinent positives and negatives are noted in the the HPI. Physical Exam:   CONSTITUTIONAL: Well developed, well nourished, in no acute distress  NEUROLOGIC: Alert and oriented  HEAD: Normocephalic, atraumatic  EYES: Sclera non-icteric  ENT: Hearing intact, moist mucous membranes  NECK: No obvious goiter or masses  RESPIRATORY: Normal respiratory effort  SKIN: No evident rashes  ABDOMEN: Soft, non-tender, non-distended  DIGITAL RECTAL EXAM: ~30 gram prostate, no nodules or tenderness    Assessment & Plan:   Sahrla Hernandez is a 79year old male with history of hypertension, hyperlipidemia, BPH/LUTS previously seen by Dr. Domenic Bob last on 8/11/2022. At that time he was satisfied with his voiding symptoms on tamsulosin 0.4 mg daily.     He is here with his son-in-law today who is translating for us. He is doing well at this time. He has a strong urinary stream.  Nocturia only once per night. He is satisfied with his voiding symptoms at this time on tamsulosin. His most recent PSA was 0.46 on 1/6/2023.    -Continue tamsulosin 0.4 mg daily  -Yearly PSA/DANIELE until age 72  -Return to clinic in 1 year or sooner as needed    In total, 30 minutes were spent on this patient encounter (including chart review, patient history, physical, and counseling, documentation, and communication). Isaac Wang.  Gama Wang MD  Staff Urologist  [facility]  Office: [phone]

## 2023-12-29 DIAGNOSIS — G25.81 RESTLESS LEGS: ICD-10-CM

## 2023-12-29 DIAGNOSIS — R25.2 LEG CRAMPS: ICD-10-CM

## 2023-12-29 RX ORDER — PRAMIPEXOLE DIHYDROCHLORIDE 0.25 MG/1
0.25 TABLET ORAL NIGHTLY
Qty: 90 TABLET | Refills: 1 | Status: SHIPPED | OUTPATIENT
Start: 2023-12-29

## 2023-12-29 NOTE — TELEPHONE ENCOUNTER
Pramipexole 0.25 mg  Filled 4-21-23  Qty 90  0 refills  Future Appointments   Date Time Provider Jay Cruz   12/20/2024 10:45 AM Sonia Mendez MD [facility]   LOV 1-6-23 RP

## 2024-03-28 RX ORDER — TAMSULOSIN HYDROCHLORIDE 0.4 MG/1
0.4 CAPSULE ORAL DAILY
Qty: 90 CAPSULE | Refills: 5 | Status: SHIPPED | OUTPATIENT
Start: 2024-03-28

## 2024-05-03 ENCOUNTER — OFFICE VISIT (OUTPATIENT)
Dept: INTERNAL MEDICINE CLINIC | Facility: CLINIC | Age: 69
End: 2024-05-03
Payer: COMMERCIAL

## 2024-05-03 VITALS
TEMPERATURE: 98 F | WEIGHT: 222 LBS | OXYGEN SATURATION: 98 % | HEART RATE: 53 BPM | SYSTOLIC BLOOD PRESSURE: 144 MMHG | BODY MASS INDEX: 36.99 KG/M2 | HEIGHT: 65 IN | DIASTOLIC BLOOD PRESSURE: 70 MMHG

## 2024-05-03 DIAGNOSIS — M17.11 PRIMARY OSTEOARTHRITIS OF RIGHT KNEE: Chronic | ICD-10-CM

## 2024-05-03 DIAGNOSIS — M54.41 ACUTE BILATERAL LOW BACK PAIN WITH RIGHT-SIDED SCIATICA: Primary | ICD-10-CM

## 2024-05-03 DIAGNOSIS — I10 PRIMARY HYPERTENSION: Chronic | ICD-10-CM

## 2024-05-03 DIAGNOSIS — E78.2 MIXED HYPERLIPIDEMIA: Chronic | ICD-10-CM

## 2024-05-03 DIAGNOSIS — Z12.11 SCREENING FOR MALIGNANT NEOPLASM OF COLON: ICD-10-CM

## 2024-05-03 RX ORDER — GABAPENTIN 300 MG/1
300 CAPSULE ORAL NIGHTLY
Qty: 30 CAPSULE | Refills: 2 | Status: SHIPPED | OUTPATIENT
Start: 2024-05-03

## 2024-05-03 NOTE — PROGRESS NOTES
Claire Carrera is a 68 year old male.   HPI:   Patient presents for follow up on several issues. Here with his daughter.    He has been dealing with lower back pain.  Radiates across the lower back; sometimes feels some numbness in right leg when he wakes up in the mornings.  Going on for 4 months. Has been receiving diclofenac intramuscular injections at home (wife was a nurse in [employer]).  The diclofenac helps symptoms temporarily.      Other chronic issues:  Osteoarthritis of right knee - chronic pain in the knee.  A little worse of late.  Hypertension - elevated systolic blood pressure.  Hyperlipidemia - on statin therapy, tolerating.    Past medical, family, surgical and social history were reviewed as listed in the chart, and are unchanged from previous visit.  REVIEW OF SYSTEMS:   GENERAL/ const: no fevers/chills, no unintentional weight loss  EYES:no vision problems  HEENT: denies sinus pain or sinus tenderness  LUNGS: denies shortness of breath   CARDIOVASCULAR: denies chest pain  GI: denies nausea/emesis/ abdominal pain diarrhea constipation  : denies dysuria   MUSCULOSKELETAL: back pain, right knee pain  NEURO: paresthesias right leg; denies headaches  PSYCHIATRIC: denies issues  ENDOCRINE: no hot/cold intolerance  ALLERGY:   Allergies   Allergen Reactions    Meloxicam OTHER (SEE COMMENTS)     \"Lower Eye Swelling\"    Aspirin RASH    Seasonal      PAST HISTORY:     Current Outpatient Medications:     gabapentin 300 MG Oral Cap, Take 1 capsule (300 mg total) by mouth nightly., Disp: 30 capsule, Rfl: 2    TAMSULOSIN 0.4 MG Oral Cap, TAKE 1 CAPSULE BY MOUTH 30 MINUTES AFTER BREAKFAST, Disp: 90 capsule, Rfl: 5    pramipexole 0.25 MG Oral Tab, Take 1 tablet (0.25 mg total) by mouth at bedtime. For restless legs, Disp: 90 tablet, Rfl: 1    diclofenac 50 MG Oral Tab EC, Take 1 tablet (50 mg total) by mouth 3 (three) times daily as needed., Disp: 45 tablet, Rfl: 3    hydroCHLOROthiazide 25 MG Oral Tab, Take 1  tablet (25 mg total) by mouth daily., Disp: 90 tablet, Rfl: 1    amLODIPine 5 MG Oral Tab, Take 1 tablet (5 mg total) by mouth daily., Disp: 90 tablet, Rfl: 1    atorvastatin 20 MG Oral Tab, Take 1 tablet (20 mg total) by mouth daily., Disp: , Rfl:     metoprolol succinate ER 50 MG Oral Tablet 24 Hr, Take 1 tablet (50 mg total) by mouth daily., Disp: , Rfl:   Medical:  has a past medical history of Allergic rhinitis (06/2021), Essential hypertension, High cholesterol, Hyperlipidemia, Obesity, and Tachycardia.  Surgical:  has a past surgical history that includes appendectomy (1986).  Family: family history includes Cancer in his father; Other in his mother.  Social:  reports that he has never smoked. He has never used smokeless tobacco. He reports current alcohol use of about 6.0 standard drinks of alcohol per week. He reports that he does not use drugs.  Wt Readings from Last 6 Encounters:   05/03/24 222 lb (100.7 kg)   10/13/23 224 lb (101.6 kg)   02/21/23 220 lb (99.8 kg)   01/06/23 227 lb 9.6 oz (103.2 kg)   10/01/21 224 lb 9.6 oz (101.9 kg)   09/03/20 225 lb (102.1 kg)     EXAM:   /70 (BP Location: Right arm, Patient Position: Sitting, Cuff Size: adult)   Pulse 53   Temp 97.7 °F (36.5 °C) (Temporal)   Ht 5' 5\" (1.651 m)   Wt 222 lb (100.7 kg)   SpO2 98%   BMI 36.94 kg/m²   GENERAL: Alert and oriented, well developed, well nourished,in no apparent distress  HEENT: atraumatic, PERRLA, EOMI, normal lid and conjunctiva  LUNGS: clear to auscultation bilaterally, no wheezing/rubs  CARDIO: RRR without murmurs.  No clubbing, cyanosis or edema.  GI: soft non tender nondistended no hepatosplenomegaly, bowel sounds throughout  NEURO: CN II-XII intact, 5/5 strength all extremities  MS: Full ROM, pain with flexion of back  PSYCH: pleasant, appropriate mood and affect  ASSESSMENT AND PLAN:   1. Acute bilateral low back pain with right-sided sciatica  Patient with 4 months of lower back pain.  Bilateral/across the  back; some radicular symptoms in right leg especially in the mornings. Diclofenac helps temporarily.  Will start on gabapentin qhs, will check XR lumbar spine.  Based on x-ray results may consider MRI.  Advised patient not to drive after taking gabapentin due to potential drowsiness.  - XR LUMBAR SPINE (MIN 4 VIEWS) (CPT=72110); Future  - gabapentin 300 MG Oral Cap; Take 1 capsule (300 mg total) by mouth nightly.  Dispense: 30 capsule; Refill: 2    2. Primary hypertension  Mildly elevated systolic blood pressure.  Suspect secondary to pain. Monitor for now, will continue HCTZ 25 mg every day, amlodipine 5 mg every day, metoprolol succinate 50 mg every day.    3. Mixed hyperlipidemia  On statin therapy. Triglycerides mildly elevated last year.  Continue atorvastatin 20 mg qhs.    4. Primary osteoarthritis of right knee  Chronic issue. Mild arthritis on x-ray.  Some increasing knee pain of late.  Suspect this could be from compensatory gait from back/radicular symptoms.  Will monitor knee for now.    5. Screening for malignant neoplasm of colon  Polyps on colonoscopy last year; 6 month recall.  Referral entered, advised patient to schedule updated colonoscopy.  - Gastro Referral - External    Patient Care Team:  Marilyn Mason MD as PCP - General (Internal Medicine)  Devin Pascal MD (Cardiovascular Diseases)  The patient indicates understanding of these issues and agrees to the plan.  The patient is asked to return to clinic in 3-4 months for follow up on chronic issues/CPX, or earlier if acute issues arise.    Marilyn Mason MD

## 2024-05-03 NOTE — PATIENT INSTRUCTIONS
- Schedule x-ray of lumbar spine (back).  Can schedule through [facility] or call central scheduling at [phone]  - Based on results we will likely check an MRI of your spine  - Stop diclofenac injections for now  - Start gabapentin (nerve pain medication).  Take 1 capsule/tablet nightly.  - Follow up with Dr. Villarreal for colonoscopy some time this year:  [address] [place] [address]  (On [address])  Phone: [phone]    - Schedule physical with me for next few months (June-September).      It was a pleasure seeing you in the clinic today.  Thank you for choosing the [facility] office for your healthcare needs. Please call at [phone] with any questions or concerns.    Marilyn Mason MD

## 2024-05-04 PROBLEM — G25.81 RESTLESS LEGS: Chronic | Status: ACTIVE | Noted: 2019-04-23

## 2024-05-05 ENCOUNTER — HOSPITAL ENCOUNTER (OUTPATIENT)
Dept: GENERAL RADIOLOGY | Age: 69
Discharge: HOME | End: 2024-05-05
Attending: INTERNAL MEDICINE
Payer: COMMERCIAL

## 2024-05-05 ENCOUNTER — HOSPITAL ENCOUNTER (OUTPATIENT)
Dept: GENERAL RADIOLOGY | Age: 69
End: 2024-05-05
Attending: INTERNAL MEDICINE
Payer: COMMERCIAL

## 2024-05-05 DIAGNOSIS — M54.41 ACUTE BILATERAL LOW BACK PAIN WITH RIGHT-SIDED SCIATICA: Primary | ICD-10-CM

## 2024-05-05 DIAGNOSIS — M47.816 ARTHRITIS OF LUMBAR SPINE: ICD-10-CM

## 2024-05-05 DIAGNOSIS — M54.41 ACUTE BILATERAL LOW BACK PAIN WITH RIGHT-SIDED SCIATICA: ICD-10-CM

## 2024-05-05 PROCEDURE — 72110 X-RAY EXAM L-2 SPINE 4/>VWS: CPT | Performed by: INTERNAL MEDICINE

## 2024-05-17 ENCOUNTER — OFFICE VISIT (OUTPATIENT)
Dept: PAIN CLINIC | Facility: CLINIC | Age: 69
End: 2024-05-17

## 2024-05-17 VITALS
DIASTOLIC BLOOD PRESSURE: 78 MMHG | SYSTOLIC BLOOD PRESSURE: 146 MMHG | WEIGHT: 222 LBS | BODY MASS INDEX: 37 KG/M2 | HEART RATE: 70 BPM | OXYGEN SATURATION: 95 %

## 2024-05-17 DIAGNOSIS — M47.816 LUMBAR SPONDYLOSIS: ICD-10-CM

## 2024-05-17 DIAGNOSIS — M54.16 RIGHT LUMBAR RADICULITIS: Primary | ICD-10-CM

## 2024-05-17 PROCEDURE — 99204 OFFICE O/P NEW MOD 45 MIN: CPT | Performed by: PHYSICIAN ASSISTANT

## 2024-05-17 PROCEDURE — 3077F SYST BP >= 140 MM HG: CPT | Performed by: PHYSICIAN ASSISTANT

## 2024-05-17 PROCEDURE — 3078F DIAST BP <80 MM HG: CPT | Performed by: PHYSICIAN ASSISTANT

## 2024-05-17 RX ORDER — METHYLPREDNISOLONE 4 MG/1
TABLET ORAL
Qty: 21 TABLET | Refills: 0 | Status: SHIPPED | OUTPATIENT
Start: 2024-05-17

## 2024-05-17 NOTE — PROGRESS NOTES
Location of Pain:  lower back radiating down right leg    Date Pain Began: Jan 2024          Work Related:   No        Receiving Work Comp/Disability:   No    Numeric Rating Scale:  Pain at Present:  4                                                                                                            (No Pain) 0  to  10 (Worst Pain)                 Minimum Pain:   4  Maximum Pain  10    Distribution of Pain:    right    Quality of Pain:    throbbing, shooting, stabbing, sharp, numbness, tingling    Origin of Pain:    Degenerative and Other Up and Down Movements    Aggravating Factors:    Sitting and Standing    Past Treatments for Current Pain Condition:   Other Gabapentin, Diclofenac    Prior diagnostic testing for your pain:  Xray

## 2024-05-17 NOTE — PROGRESS NOTES
Patient: Claire Carrera  Medical Record Number: [id number]  Site: [facility]  Referring Physician:  Marilyn Mason  PCP: same    Dear Dr. Mason:    Thank you very much for requesting this consultation. I had the opportunity to evaluate and initiate care for your patient today, as per your request.    HISTORY OF CHIEF COMPLAINT:      Claire Carrera is a 68 year old male, who complains of low back and R lateral thigh and lower leg pain.    Patient is here today with pain in above described distribution that began atraumatically \"a few months ago.\"  Initially, simply lived with it and tried limiting ADLs, to no relief, and as pain progressed, was seen by PCP.  Given neurontin, which he states has been partially effective.  Sent for XR, and here for further options.      VAS Pain Score:  4-9/10    Aggravating Factors: Relieving Factors:   ROM L spine  Changing positions   Heating pad      Past Treatment Attempted/Patient’s Response:  As above     Past Medical History:    Allergic rhinitis    Essential hypertension    High cholesterol    Hyperlipidemia    Obesity    Tachycardia      Past Surgical History:   Procedure Laterality Date    Appendectomy  1986      Family History   Problem Relation Age of Onset    Cancer Father         Throat    Other (Other) Mother         Cirrhosis (not due to alcoholism)      Social History     Socioeconomic History    Marital status:    Tobacco Use    Smoking status: Never    Smokeless tobacco: Never   Vaping Use    Vaping status: Never Used   Substance and Sexual Activity    Alcohol use: Yes     Alcohol/week: 6.0 standard drinks of alcohol     Types: 2 Glasses of wine, 4 Shots of liquor per week     Comment: 3 drinks / week    Drug use: No   Other Topics Concern    Caffeine Concern Yes     Comment: Daily coffee    Exercise No    Seat Belt Yes    Special Diet No    Stress Concern No    Weight Concern Yes      Current Medications:  Current Outpatient Medications    Medication Sig Dispense Refill    gabapentin 300 MG Oral Cap Take 1 capsule (300 mg total) by mouth nightly. 30 capsule 2    TAMSULOSIN 0.4 MG Oral Cap TAKE 1 CAPSULE BY MOUTH 30 MINUTES AFTER BREAKFAST 90 capsule 5    pramipexole 0.25 MG Oral Tab Take 1 tablet (0.25 mg total) by mouth at bedtime. For restless legs 90 tablet 1    hydroCHLOROthiazide 25 MG Oral Tab Take 1 tablet (25 mg total) by mouth daily. 90 tablet 1    amLODIPine 5 MG Oral Tab Take 1 tablet (5 mg total) by mouth daily. 90 tablet 1    atorvastatin 20 MG Oral Tab Take 1 tablet (20 mg total) by mouth daily.      metoprolol succinate ER 50 MG Oral Tablet 24 Hr Take 1 tablet (50 mg total) by mouth daily.      diclofenac 50 MG Oral Tab EC Take 1 tablet (50 mg total) by mouth 3 (three) times daily as needed. (Patient not taking: Reported on 5/17/2024) 45 tablet 3        Functional Assessment: Patient reports that they are able to complete all of their ADL's such as eating, bathing, using the toilet, dressing and getting up from a bed or a chair independently.    Work History:  The patient currently works full time as .       REVIEW OF SYSTEMS:   10 point review of systems is otherwise negative,unless otherwise in HPI.      Radiology/Lab Test Reviewed:  XR L spine:    CONCLUSION:    1. There are 4 non-rib-bearing lumbar type vertebral bodies.  For purposes of this exam the last rib bearing vertebral body will be considered L1.    2. There is mild loss of vertebral body height of L4 suggesting a very mild superior endplate compression of   indeterminate age.  MRI may aid in further evaluation if indicated clinically.   3. Degenerative and arthritic changes in the lumbar spine as detailed above.   4. There is 5 mm retrolisthesis of L 3 on L4.     CBC:    Lab Results   Component Value Date    WBC 7.3 01/06/2023    WBC 6.6 10/01/2021    WBC 6.6 07/23/2020   No results found for: \"HEMOGLOBIN\"  Lab Results   Component Value Date    .0  01/06/2023    .0 10/01/2021    .0 07/23/2020         PHYSICAL EXAMIMATION   PHYSICAL EXAMINATION: Claire Carrera is a 68 year old male who is observed sitting comfortably on a chair in the exam room alert and oriented times three. He looks consistent with his stated age.    Ht Readings from Last 1 Encounters:   05/03/24 65\"     Wt Readings from Last 1 Encounters:   05/17/24 222 lb (100.7 kg)     The patient is well developed, well nourished, well muscled, obese. He moves independently from sitting to standing with ease.       Inspection:  No acute distress    Patient displays Non-antalgic gait.    Coordination:  Well-coordinated, fluent gait, able to engage in rapid alternating movements of upper and lower extremities.    ROM Lumbar Spine:  See chart below:  Motion Right (+ or -) Left (+ or -)   Lumbar Flexion + -   Lumbar Extension + -   Lumbar Bending + -   Lumbar Extension/ twisting motion - -     Lumbar/Sacral Integument:  Skin over lumbar sacral spine is intact without rashes, excoriations, lesions or erythema noted    Palpation:  See chart below:  Palpation of lumbar area Right (+ or -) Left (+ or -)   Lumbar facets - -   Lumbar paraspinals - -   Piriformis - -   SIJ - -   Trochanteric Bursa - -     Strength:  Strength of bilateral lower extremities grossly intact; 5/5 throughout    Sensation:  No sensory deficits noted on bilateral lower extremities to light touch    Tests:  Test Right (+ or -) Left (+ or -)   SLR + -   Adriel’s     Babinski     Clonus       HEAD/NECK: Head is normocephalic, neck supple  EYES: EOMI, FERNANDA  SKIN EXAM: Skin is intact, head, neck, trunk and arms/legs. No rashes, mottling or ulcerations.  LYMPH EXAM: There is no lymph edema in either lower extremity.  VASCULAR EXAM: Pedal pulses are normal bilaterally, with good distal perfusion. No clubbing or cyanosis.  ABDOMINAL EXAM: Abdomen is soft without masses palpated.  HEART: normal, regular, S1 and  S2  LUNGS:CTA  MUSCULOSKELETAL: Smooth, pain-free ROM to bilat hips, ankles, and knees.     Do you have any known blood/bleeding disorders?  No  Does patient currently take blood thinners?   None  Does patient currently take any antibiotics?   No      Patient is currently on pain medications:  No  Reason pain medications are prescribed: N/A  Pain medications are prescribed by: N/A  [place] Prescription Monitoring review: N/A  DIRE: N/A  Treatment decision: N/A    MEDICAL DECISION MAKING:     Impression: right lumbar radiculitis, lumbar spondylosis     Low back with radicular right lower extremity pain along the lateral thigh to the lateral lower leg, stopping at the ankle in the setting of x-ray evidence of degenerative changes.  He has found partial improvement with Neurontin, though pain certainly continues.  Will have him try a round of tapered oral steroid, and was sent to physical therapy.  If conservative treatment is ineffective, contact clinic, and we will be happy to place an order for an MRI of the lumbar spine prior to consideration of injections.    Plan: Continue Neurontin, course of tapered oral steroid, initiate physical therapy.  If conservative management ineffective, obtain MRI prior to consideration of injections.    The patient indicates understanding of these issues and agrees to the plan.      Thank you very much.     Respectfully yours,    SOLE Skinner

## 2024-05-17 NOTE — PATIENT INSTRUCTIONS
Refill policies:    Allow 2-3 business days for refills; controlled substances may take longer.  Contact your pharmacy at least 5 days prior to running out of medication and have them send an electronic request or submit request through the “request refill” option in your [employer] account.  Refills are not addressed on weekends; covering physicians do not authorize routine medications on weekends.  No narcotics or controlled substances are refilled after noon on Fridays or by on call physicians.  By law, narcotics must be electronically prescribed.  A 30 day supply with no refills is the maximum allowed.  If your prescription is due for a refill, you may be due for a follow up appointment.  To best provide you care, patients receiving routine medications need to be seen at least once a year.  Patients receiving narcotic/controlled substance medications need to be seen at least once every 3 months.  In the event that your preferred pharmacy does not have the requested medication in stock (e.g. Backordered), it is your responsibility to find another pharmacy that has the requested medication available.  We will gladly send a new prescription to that pharmacy at your request.    Scheduling Tests:    If your physician has ordered radiology tests such as MRI or CT scans, please contact Central Scheduling at [phone] right away to schedule the test.  Once scheduled, the [facility] Centralized Referral Team will work with your insurance carrier to obtain pre-certification or prior authorization.  Depending on your insurance carrier, approval may take 3-10 days.  It is highly recommended patients assure they have received an authorization before having a test performed.  If test is done without insurance authorization, patient may be responsible for the entire amount billed.      Precertification and Prior Authorizations:  If your physician has recommended that you have a procedure or additional testing performed the [facility]  Centralized Referral Team will contact your insurance carrier to obtain pre-certification or prior authorization.    You are strongly encouraged to contact your insurance carrier to verify that your procedure/test has been approved and is a COVERED benefit.  Although the [facility] Centralized Referral Team does its due diligence, the insurance carrier gives the disclaimer that \"Although the procedure is authorized, this does not guarantee payment.\"    Ultimately the patient is responsible for payment.   Thank you for your understanding in this matter.  Paperwork Completion:  If you require FMLA or disability paperwork for your recovery, please make sure to either drop it off or have it faxed to our office at [phone]. Be sure the form has your name and date of birth on it.  The form will be faxed to our Forms Department and they will complete it for you.  There is a 25$ fee for all forms that need to be filled out.  Please be aware there is a 10-14 day turnaround time.  You will need to sign a release of information (YOKASTA) form if your paperwork does not come with one.  You may call the Forms Department with any questions at [phone].  Their fax number is [phone].

## 2024-05-17 NOTE — PROGRESS NOTES
Patient presents in office today with reported pain in lower back radiating down right leg    Current pain level reported = 4/10    Last reported dose of NA today    Narcotic Contract renewal NA    Urine Drug screen NA

## 2024-05-24 DIAGNOSIS — G25.81 RESTLESS LEGS: ICD-10-CM

## 2024-05-24 DIAGNOSIS — R25.2 LEG CRAMPS: ICD-10-CM

## 2024-05-24 NOTE — TELEPHONE ENCOUNTER
LOV: 5/3/2024 with Dr. Mason  RTC: 3-4 months  Last Relevant Labs: 6/27/2023 (LIPID PANEL)  Filled: 12/29/2023    #90 with 1 refill    Future Appointments   Date Time Provider Department Center   12/20/2024 10:45 AM Juan Ford MD [id number]

## 2024-05-26 RX ORDER — PRAMIPEXOLE DIHYDROCHLORIDE 0.25 MG/1
0.25 TABLET ORAL NIGHTLY
Qty: 90 TABLET | Refills: 1 | Status: SHIPPED | OUTPATIENT
Start: 2024-05-26

## 2024-07-18 ENCOUNTER — TELEPHONE (OUTPATIENT)
Dept: PHYSICAL THERAPY | Facility: HOSPITAL | Age: 69
End: 2024-07-18

## 2024-07-19 ENCOUNTER — OFFICE VISIT (OUTPATIENT)
Dept: PHYSICAL THERAPY | Age: 69
End: 2024-07-19
Attending: PHYSICIAN ASSISTANT
Payer: COMMERCIAL

## 2024-07-19 DIAGNOSIS — M54.16 RIGHT LUMBAR RADICULITIS: Primary | ICD-10-CM

## 2024-07-19 DIAGNOSIS — M47.816 LUMBAR SPONDYLOSIS: ICD-10-CM

## 2024-07-19 PROCEDURE — 97140 MANUAL THERAPY 1/> REGIONS: CPT

## 2024-07-19 PROCEDURE — 97162 PT EVAL MOD COMPLEX 30 MIN: CPT

## 2024-07-19 PROCEDURE — 97110 THERAPEUTIC EXERCISES: CPT

## 2024-07-19 NOTE — PROGRESS NOTES
SPINE EVALUATION:     Diagnosis:   R lumbar radiculopathy, lumbar spondylosis    Referring Provider: Earl Thomas  Date of Evaluation:    7/19/2024    Precautions:  None   Next MD visit:   none scheduled  Date of Surgery: n/a     PATIENT SUMMARY   Claire Carrera is a 69 year old male who presents to therapy today with complaints of pain which began to increase about 6 months ago.  Pt describes pain level current 8/10, at best 4/10, at worst 10/10.   Pain occurs centrally in the lower back and then radiates to the R LE.  Numbness and shooting pain occurs laterally in the proximal leg the then into the medial ankle.  Current functional limitations include prolonged sitting, morning ADLs, sleeping.  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    Telat describes prior level of function no limitation. Pt goals include reduce pain.  Pt denies diplopia, dysarthria, dysphasia, dizziness, drop attacks, bowel/bladder changes, saddle anesthesia, and DARIAN LE N/T.  Past medical history was reviewed with Claire. Significant findings include:  Past Medical History:    Allergic rhinitis    Essential hypertension    High cholesterol    Hyperlipidemia    Obesity    Tachycardia       Past Surgical History:   Procedure Laterality Date    Appendectomy  1986       Recent Imaging:  XR LUMBAR SPINE (MIN 4 VIEWS) (CPT=72110)    Result Date: 5/5/2024  CONCLUSION:  1. There are 4 non-rib-bearing lumbar type vertebral bodies.  For purposes of this exam the last rib bearing vertebral body will be considered L1.  2. There is mild loss of vertebral body height of L4 suggesting a very mild superior endplate compression of indeterminate age.  MRI may aid in further evaluation if indicated clinically. 3. Degenerative and arthritic changes in the lumbar spine as detailed above. 4. There is 5 mm retrolisthesis of L 3 on L4.     LOCATION:  [facility]   Dictated by (CST): Jude Jackson MD on 5/05/2024 at 9:22 AM      Finalized by (CST): Jude Jackson MD on 5/05/2024 at 9:26 AM          ASSESSMENT  Telat presents to physical therapy evaluation with primary c/o LBP and R LE radicular symptoms. The results of the objective tests and measures show pain with trunk ROM, decreased core and hip strength, radicular symptoms consistent with R L3-4 dermatome, and positive sciatic tension.  Functional deficits include but are not limited to prolonged sitting, morning ADLs, sleeping.  Signs and symptoms are consistent with diagnosis of LBP and radiculopathy. Pt and PT discussed evaluation findings, pathology, POC and HEP.  Pt voiced understanding and performs HEP correctly without reported pain. Skilled Physical Therapy is medically necessary to address the above impairments and reach functional goals.     OBJECTIVE:   Observation/Posture: reduced lordotic curve  Neuro Screen: intact to gross touch except with intermittent radicular symptoms occur.  Numbness and shooting pain occurs laterally in the proximal leg the then into the medial ankle. (L3-4)  Accessory motion: hypomobility R lower lumbar  Palpation: TTP and stiffness in R lower lumbar region.          A/PROM Strength    R L R L   Lumbar         Flex WNL  Abs:  3/5      Ext Min restrict* (central LBP)        Sidebending Min* (R LBP) WNL       Rotation WNL* (R LBP end range) WNL* (R LBP end range)     Hip         Flexion 110* WNL 4-* 4*     Extension 5 5 3+ 3+     Abduction   4 4     Internal Rot WNL WNL       External Rot 45* 50     Knee WNL WNL       Flexion   4* 5     Extension   5 5   Foot / Ankle WNL WNL       DF   5 5     PF   5 5     Inversion   5 5     Eversion   5 5   * indicates pain    Flexibility:   LE   Hamstrings: R -25*; L -20     Special tests:   + R SLR  (40 deg hip flex),  neg XSLR    Today’s Treatment and Response:   Pt education was provided on exam findings, treatment diagnosis, treatment plan, expectations, and prognosis.   Date: 7/19/2024  Tx#:   1   STM -  s/l R L3-5 paraspinals  L Rot L3-5 Gr II+  Supine long axis traction, R/L, x3, 10 sec intermittent   LTR, x20  TrA March, x20  Single leg clam, x20, red, R/L  SKCS, 10 sec, x2, R/L                  HEP: (7/19/2024)  - Supine Lower Trunk Rotation  - 1-2 x daily - 1 sets - 10-20 reps  - Supine March  - 1-2 x daily - 1 sets - 10-20 reps  - Hooklying single leg clamshell  - 1-2 x daily - 1 sets - 10-20 reps  - Supine Single Knee to Chest Stretch  - 1-2 x daily - 1 sets - 2-3 reps - 10 hold    Charges: PT Eval Moderate Complexity, 1 Man (15 min), 1 TherEx (10 min)      Total Timed Treatment: 25 min     Total Treatment Time: 40 min     Based on clinical rationale and outcome measures, this evaluation involved Moderate Complexity decision making due to 3+ personal factors/comorbidities, 3 body structures involved/activity limitations.  PLAN OF CARE:    Goals:    (10 visits)  Pt will have no greater than 1-2/10 pain to ease prolonged sitting and sleeping.  Pt will have no restriction with lumbar ROM to ease transfers.  Pt will have at least -20 hamstring length to ease standing and sitting.  Pt will have at least 55 degrees hip ER to ease donning/doffing of shoes.  Pt will have at least 4+ /5 hip strength and improved core strength to ease amb and transfers.  Pt will have negative SLR to demonstrate reduced neural tension and ease pain with job activities.    Frequency / Duration: Patient will be seen for 1-2 x/week or a total of 10 visits over a 90 day period. Treatment will include: Therapeutic Exercise, Manual Therapy, Neuro Re-ed, and Modalities prn    Education or treatment limitation: None  Rehab Potential:good    Oswestry Disability Index Score  Score: 26 % (7/18/2024  8:47 PM)    Patient/Family/Caregiver was advised of these findings, precautions, and treatment options and has agreed to actively participate in planning and for this course of care.    Thank you for your referral. Please co-sign or sign and return  this letter via fax as soon as possible to [phone]. If you have any questions, please contact me at Dept: [phone]    Sincerely,  Electronically signed by therapist: Pamela Zendejas PT    Physician's certification required: Yes  I certify the need for these services furnished under this plan of treatment and while under my care.    X___________________________________________________ Date____________________    Certification From: 7/19/2024  To:10/17/2024

## 2024-07-26 ENCOUNTER — OFFICE VISIT (OUTPATIENT)
Dept: PHYSICAL THERAPY | Age: 69
End: 2024-07-26
Attending: PHYSICIAN ASSISTANT
Payer: COMMERCIAL

## 2024-07-26 PROCEDURE — 97140 MANUAL THERAPY 1/> REGIONS: CPT

## 2024-07-26 PROCEDURE — 97110 THERAPEUTIC EXERCISES: CPT

## 2024-07-26 NOTE — PROGRESS NOTES
Diagnosis:   R lumbar radiculopathy, lumbar spondylosis    Referring Provider: Earl Thomas  Date of Evaluation:    7/19/2024    Precautions:  None  Speaks Leeann   Next MD visit:   none scheduled  Date of Surgery: n/a     Insurance Primary/Secondary: [employer] [place] HMO / N/A     # Auth Visits: 5           Subjective: Pt felt okay after the last visit.  Pain: 2-3/10    Objective:   TTP R lumbar paraspinals  Glut max: 4/5    Assessment: Pt had some soreness with standing hip extension secondary to lower back extension compensation.  Plan to progress core/hip strengthening as we promote foraminal opening to reduce nerve pain.    Goals:    (10 visits)  Pt will have no greater than 1-2/10 pain to ease prolonged sitting and sleeping.  Pt will have no restriction with lumbar ROM to ease transfers.  Pt will have at least -20 hamstring length to ease standing and sitting.  Pt will have at least 55 degrees hip ER to ease donning/doffing of shoes.  Pt will have at least 4+ /5 hip strength and improved core strength to ease amb and transfers.  Pt will have negative SLR to demonstrate reduced neural tension and ease pain with job activities.    Plan: next visit: consider SLR, 2 step march, tilt board  Date: 7/19/2024  Tx#:   1 7/26/2024  Tx#: 2   STM - s/l R L3-5 paraspinals  L Rot L3-5 Gr II+  Supine long axis traction, R/L, x3, 10 sec intermittent STM - s/l R L3-5 paraspinals  L Rot L3-5 Gr II+  Supine long axis traction, R/L, x3, 10 sec intermittent    PROM ER R, 1 min   LTR, x20  TrA March, x20  Single leg clam, x20, red, R/L  SKCS, 10 sec, x2, R/L LTR, x20  TrA March, x20  Single leg clam, x20, red, R/L  Bridge, x20  SB ham curl, x20    Shuttle - leg press  DL: x20, 5c; SL: x20, 3c    Side stepping, x3, 10 ft, red   Hip ext, x20, red    Cable walkout, back x15, 35#    Sitting, SB flex to stretch, x10  Fwd step up, 4 inch, x15    NuStep 5 min, 2 load   HEP: (7/19/2024)  - Supine Lower Trunk Rotation  - 1-2 x daily - 1 sets  - 10-20 reps  - Supine March  - 1-2 x daily - 1 sets - 10-20 reps  - Hooklying single leg clamshell  - 1-2 x daily - 1 sets - 10-20 reps  - Supine Single Knee to Chest Stretch  - 1-2 x daily - 1 sets - 2-3 reps - 10 hold    Charges: 1 Man (12 min), 2 TherEx (28 min)       Total Timed Treatment: 40 min  Total Treatment Time: 40 min

## 2024-08-02 ENCOUNTER — OFFICE VISIT (OUTPATIENT)
Dept: PHYSICAL THERAPY | Age: 69
End: 2024-08-02
Attending: PHYSICIAN ASSISTANT
Payer: COMMERCIAL

## 2024-08-02 PROCEDURE — 97140 MANUAL THERAPY 1/> REGIONS: CPT

## 2024-08-02 PROCEDURE — 97110 THERAPEUTIC EXERCISES: CPT

## 2024-08-02 NOTE — PROGRESS NOTES
Diagnosis:   R lumbar radiculopathy, lumbar spondylosis    Referring Provider: Earl Thomas  Date of Evaluation:    7/19/2024    Precautions:  None  Speaks Leeann   Next MD visit:   none scheduled  Date of Surgery: n/a     Insurance Primary/Secondary: [employer] [place] / N/A     # Auth Visits: 5           Subjective:  Pt is doing okay - he feels a little better after last visit.  Pain: 2-3/10    Objective:   TTP R lumbar paraspinals  Glut max: 4/5    Assessment: Pt had less pain with standing hip extension today.  Pt would benefit from continued progression of core stabilization activities to continue toward his functional goals.    Goals:    (10 visits)  Pt will have no greater than 1-2/10 pain to ease prolonged sitting and sleeping.  Pt will have no restriction with lumbar ROM to ease transfers.  Pt will have at least -20 hamstring length to ease standing and sitting.  Pt will have at least 55 degrees hip ER to ease donning/doffing of shoes.  Pt will have at least 4+ /5 hip strength and improved core strength to ease amb and transfers.  Pt will have negative SLR to demonstrate reduced neural tension and ease pain with job activities.    Plan: next visit: update HEP, 5 visit auth, [facility] mini lunge  Date: 7/19/2024  Tx#:   1 7/26/2024  Tx#: 2 8/2/2024  Tx#: 3   STM - s/l R L3-5 paraspinals  L Rot L3-5 Gr II+  Supine long axis traction, R/L, x3, 10 sec intermittent STM - s/l R L3-5 paraspinals  L Rot L3-5 Gr II+  Supine long axis traction, R/L, x3, 10 sec intermittent    PROM ER R, 1 min STM - s/l R L3-5 paraspinals  L Rot L3-5 Gr II+  Supine long axis traction, R/L, x3, 10 sec intermittent   LTR, x20  TrA March, x20  Single leg clam, x20, red, R/L  SKCS, 10 sec, x2, R/L LTR, x20  TrA March, x20  Single leg clam, x20, red, R/L  Bridge, x20  SB ham curl, x20 S/l SLR, R, x20  SLR, R/L, x20  Single leg clam, x20, red, R/L  2 step march, x15  SB ham curl, x20    Shuttle - leg press  DL: x20, 5c; SL: x20, 3c Shuttle -  leg press  DL: x20, 5c; SL: x20, 3c    Side stepping, x3, 10 ft, red   Hip ext, x20, red Tilt board, AP, ML, x20  Side stepping, x3, 10 ft, red   Hip ext, x20, red    Cable walkout, back x15, 35# Cable walkout, back x15, 35#    Sitting, SB flex to stretch, x10  Fwd step up, 4 inch, x15     NuStep 5 min, 2 load NuStep 5 min, 4 load   HEP: (7/19/2024)  - Supine Lower Trunk Rotation  - 1-2 x daily - 1 sets - 10-20 reps  - Supine March  - 1-2 x daily - 1 sets - 10-20 reps  - Hooklying single leg clamshell  - 1-2 x daily - 1 sets - 10-20 reps  - Supine Single Knee to Chest Stretch  - 1-2 x daily - 1 sets - 2-3 reps - 10 hold    Charges: 1 Man (12 min), 2 TherEx (28 min)       Total Timed Treatment: 40 min  Total Treatment Time: 40 min

## 2024-08-16 ENCOUNTER — APPOINTMENT (OUTPATIENT)
Dept: PHYSICAL THERAPY | Age: 69
End: 2024-08-16
Attending: PHYSICIAN ASSISTANT
Payer: COMMERCIAL

## 2024-08-23 ENCOUNTER — OFFICE VISIT (OUTPATIENT)
Dept: PHYSICAL THERAPY | Age: 69
End: 2024-08-23
Attending: PHYSICIAN ASSISTANT
Payer: COMMERCIAL

## 2024-08-23 PROCEDURE — 97110 THERAPEUTIC EXERCISES: CPT

## 2024-08-23 PROCEDURE — 97140 MANUAL THERAPY 1/> REGIONS: CPT

## 2024-08-23 NOTE — PROGRESS NOTES
Diagnosis:   R lumbar radiculopathy, lumbar spondylosis    Referring Provider: Earl Thomas  Date of Evaluation:    7/19/2024    Precautions:  None  Leis Frisian   Next MD visit:   none scheduled  Date of Surgery: n/a     Insurance Primary/Secondary: [employer] / N/A     # Auth Visits: 5           Subjective:  Pt notes he continues to have pain in the R lower back.  Pain: 2-3/10    Objective:   Glut: 4/5  Reduced symptoms with lower lumbar foraminal opening manual therapy to lower R L4-5 region    Assessment:  Pt was able to progress WB strengthening today without increase in symptoms.  Pt responded positively to foraminal opening manual therapy.  Plan to progress core stabilization to reduce lumbar strain.    Goals:    (10 visits)  Pt will have no greater than 1-2/10 pain to ease prolonged sitting and sleeping.  Pt will have no restriction with lumbar ROM to ease transfers.  Pt will have at least -20 hamstring length to ease standing and sitting.  Pt will have at least 55 degrees hip ER to ease donning/doffing of shoes.  Pt will have at least 4+ /5 hip strength and improved core strength to ease amb and transfers.  Pt will have negative SLR to demonstrate reduced neural tension and ease pain with job activities.    Plan: next visit:  5 visit auth  Date: 7/19/2024  Tx#:   1 7/26/2024  Tx#: 2 8/2/2024  Tx#: 3 8/23/2024  Tx#: 4   STM - s/l R L3-5 paraspinals  L Rot L3-5 Gr II+  Supine long axis traction, R/L, x3, 10 sec intermittent STM - s/l R L3-5 paraspinals  L Rot L3-5 Gr II+  Supine long axis traction, R/L, x3, 10 sec intermittent    PROM ER R, 1 min STM - s/l R L3-5 paraspinals  L Rot L3-5 Gr II+  Supine long axis traction, R/L, x3, 10 sec intermittent STM - s/l R L3-5 paraspinals  L Rot L3-5 Gr III-IV  Supine long axis traction, R/L, x3, 10 sec intermittent   LTR, x20  TrA March, x20  Single leg clam, x20, red, R/L  SKCS, 10 sec, x2, R/L LTR, x20  TrA March, x20  Single leg clam, x20, red,  R/L  Bridge, x20  SB ham curl, x20 S/l SLR, R, x20  SLR, R/L, x20  Single leg clam, x20, red, R/L  2 step march, x15  SB ham curl, x20 S/l SLR, R, x20  SLR, R/L, x20  2 step march, x20  Bridge, x20    Shuttle - leg press  DL: x20, 5c; SL: x20, 3c Shuttle - leg press  DL: x20, 5c; SL: x20, 3c Shuttle - leg press  DL: x20, 6c; SL: x20, 3c    Side stepping, x3, 10 ft, red   Hip ext, x20, red Tilt board, AP, ML, x20  Side stepping, x3, 10 ft, red   Hip ext, x20, red Side stepping, x3, 10 ft, green  Hip ext, x20, green     Cable walkout, back x15, 35# Cable walkout, back x15, 35# Cable walkout, back x15, 35#    Sitting, SB flex to stretch, x10  Fwd step up, 4 inch, x15  BOSU lunge, x20    NuStep 5 min, 2 load NuStep 5 min, 4 load NuStep 5 min, 5 load   HEP: (7/19/2024)  - Side Stepping with Resistance at Ankles  - 1-2 x daily - 1 sets - 10-20 reps  - Supine Straight Leg Raises  - 1-2 x daily - 1 sets - 10-20 reps  - Side Leg Lifts  - 1-2 x daily - 1 sets - 10-20 reps  - Supine Bridge  - 1-2 x daily - 1 sets - 10-20 reps    Charges: 1 Man (12 min), 2 TherEx (28 min)       Total Timed Treatment: 40 min  Total Treatment Time: 40 min

## 2024-08-30 ENCOUNTER — OFFICE VISIT (OUTPATIENT)
Dept: PHYSICAL THERAPY | Age: 69
End: 2024-08-30
Attending: PHYSICIAN ASSISTANT
Payer: COMMERCIAL

## 2024-08-30 PROCEDURE — 97140 MANUAL THERAPY 1/> REGIONS: CPT

## 2024-08-30 PROCEDURE — 97110 THERAPEUTIC EXERCISES: CPT

## 2024-08-30 NOTE — PROGRESS NOTES
Diagnosis:   R lumbar radiculopathy, lumbar spondylosis    Referring Provider: Earl Thomas  Date of Evaluation:    7/19/2024    Precautions:  None  Speaks Romansh   Next MD visit:   none scheduled  Date of Surgery: n/a     Insurance Primary/Secondary: [employer] / N/A     # Auth Visits: 5            Discharge Summary  Pt has attended 5 visits in Physical Therapy.     Subjective:  Pt states he is feeling better.  The new exercises for home has helped.  He feels he can continue these independently.  Pain: 2-3/10    Objective:   Observation/Posture: reduced lordotic curve  Neuro Screen: intact to gross touch.  Pt denies LE n/t.  Accessory motion: improved lower lumbar segmental mobility           A/PROM Strength    R L R L   Lumbar         Flex WNL  Abs:  4-/5      Ext WNL (min soreness)        Sidebending WNL WNL       Rotation WNL WNL     Hip         Flexion WNL WNL 4+ 4+     Extension 10 10  4+     Abduction   4+ 4+     Internal Rot WNL WNL       External Rot 50 50 4+    Knee WNL WNL       Flexion   5 5     Extension   5 5   Foot / Ankle WNL WNL       DF   5 5     PF   5 5     Inversion   5 5     Eversion   5 5   * indicates pain    Flexibility:   LE   Hamstrings: R -20; L -20     Special tests:   -R SLR (improved since eval)  neg XSLR    Assessment:  Pt has been treated in PT for LBP and radiculopathy.  Pt presents with improved ROM, flexibility, strength, and WNL neuro mobility.  Pt has returned to his PLOF as symptoms have reduced.  He still notes mild symptoms, but will continue his HEP to further strengthen.  Pt is d/c at this time.    Goals:    (10 visits)  MET  Pt will have no greater than 1-2/10 pain to ease prolonged sitting and sleeping.  Pt will have no restriction with lumbar ROM to ease transfers.  Pt will have at least -20 hamstring length to ease standing and sitting.  Pt will have at least 55 degrees hip ER to ease donning/doffing of shoes.  Pt will have at least 4+ /5 hip strength and  improved core strength to ease amb and transfers.  Pt will have negative SLR to demonstrate reduced neural tension and ease pain with job activities.    Plan: d/c with HEP     Patient/Family/Caregiver was advised of these findings, precautions, and treatment options and has agreed to actively participate in planning and for this course of care.    Thank you for your referral. If you have any questions, please contact me at Dept: [phone].    Sincerely,  Electronically signed by therapist: Pamela Zendejas PT     Physician's certification required:  No      Date: 7/19/2024  Tx#:   1 7/26/2024  Tx#: 2 8/2/2024  Tx#: 3 8/23/2024  Tx#: 4 8/30/2024  Tx#: 5   STM - s/l R L3-5 paraspinals  L Rot L3-5 Gr II+  Supine long axis traction, R/L, x3, 10 sec intermittent STM - s/l R L3-5 paraspinals  L Rot L3-5 Gr II+  Supine long axis traction, R/L, x3, 10 sec intermittent    PROM ER R, 1 min STM - s/l R L3-5 paraspinals  L Rot L3-5 Gr II+  Supine long axis traction, R/L, x3, 10 sec intermittent STM - s/l R L3-5 paraspinals  L Rot L3-5 Gr III-IV  Supine long axis traction, R/L, x3, 10 sec intermittent STM - s/l R L3-5 paraspinals  L Rot L3-5 Gr III-IV  Supine long axis traction, R/L, x3, 10 sec intermittent   LTR, x20  TrA March, x20  Single leg clam, x20, red, R/L  SKCS, 10 sec, x2, R/L LTR, x20  TrA March, x20  Single leg clam, x20, red, R/L  Bridge, x20  SB ham curl, x20 S/l SLR, R, x20  SLR, R/L, x20  Single leg clam, x20, red, R/L  2 step march, x15  SB ham curl, x20 S/l SLR, R, x20  SLR, R/L, x20  2 step march, x20  Bridge, x20 S/l SLR, R/L, x20  SLR, R, x20  2 step march, x20  Bridge, x20    Shuttle - leg press  DL: x20, 5c; SL: x20, 3c Shuttle - leg press  DL: x20, 5c; SL: x20, 3c Shuttle - leg press  DL: x20, 6c; SL: x20, 3c Shuttle - leg press  DL: x20, 7c SL: x20, 4c    Side stepping, x3, 10 ft, red   Hip ext, x20, red Tilt board, AP, ML, x20  Side stepping, x3, 10 ft, red   Hip ext, x20, red Side stepping, x3, 10 ft,  green  Hip ext, x20, green  Side stepping, x3, 10 ft, green  Hip ext, x20, green     Cable walkout, back x15, 35# Cable walkout, back x15, 35# Cable walkout, back x15, 35# cable walkout, back x15, 45#    Sitting, SB flex to stretch, x10  Fwd step up, 4 inch, x15  BOSU lunge, x20 BOSU lunge, x20    NuStep 5 min, 2 load NuStep 5 min, 4 load NuStep 5 min, 5 load NuStep 5 min, 5 load   HEP: (8/23/24)  - Side Stepping with Resistance at Ankles  - 1-2 x daily - 1 sets - 10-20 reps  - Supine Straight Leg Raises  - 1-2 x daily - 1 sets - 10-20 reps  - Side Leg Lifts  - 1-2 x daily - 1 sets - 10-20 reps  - Supine Bridge  - 1-2 x daily - 1 sets - 10-20 reps    Charges: 1 Man (12 min), 2 TherEx (28 min)       Total Timed Treatment: 40 min  Total Treatment Time: 40 min

## 2024-10-19 DIAGNOSIS — R25.2 LEG CRAMPS: ICD-10-CM

## 2024-10-19 DIAGNOSIS — G25.81 RESTLESS LEGS: ICD-10-CM

## 2024-10-21 RX ORDER — PRAMIPEXOLE DIHYDROCHLORIDE 0.25 MG/1
0.25 TABLET ORAL NIGHTLY PRN
Qty: 90 TABLET | Refills: 3 | Status: SHIPPED | OUTPATIENT
Start: 2024-10-21

## 2024-10-21 NOTE — TELEPHONE ENCOUNTER
PRAMIPEXOLE 0.25MG TABLETS          Will file in chart as: PRAMIPEXOLE 0.25 MG Oral Tab    Sig: ( GENERIC EQUIVALENT FOR MIRAPEX) TAKE 1 TABLET BY MOUTH AT BEDTIME FOR RESTLESS LEGS    Disp: 90 tablet    Refills: 1 (Pharmacy requested: Not specified)    Start: 10/19/2024    Class: Normal    Non-formulary For: Restless legs, Leg cramps    Last ordered: 4 months ago (5/26/2024) by Marilyn Mason MD    Last refill: 5/28/2024    Rx #: [id number]    Neurology Medications [id number]/19/2024 09:32 AM   Protocol Details In person appointment or virtual visit in the past 6 mos or appointment in next 3 mos      LOV 5/3/24   RTC 3 months  Filled 5/26/24 90 tabs 1 refill   Future Appointments   Date Time Provider Department Center   12/20/2024 10:45 AM Juan Ford MD [id number]

## 2024-10-28 DIAGNOSIS — I10 ESSENTIAL HYPERTENSION: ICD-10-CM

## 2024-10-28 RX ORDER — AMLODIPINE BESYLATE 5 MG/1
5 TABLET ORAL DAILY
Qty: 90 TABLET | Refills: 0 | Status: SHIPPED | OUTPATIENT
Start: 2024-10-28

## 2024-10-28 NOTE — TELEPHONE ENCOUNTER
LOV: 5/3/2024 with Dr. Mason  RTC: 3-4 months  Last Relevant Labs: 6/27/2023 (LIPID PANEL)  Last Refill: 9/18/2023   #90 with 1 refill    Future Appointments   Date Time Provider Department Center   12/20/2024 10:45 AM Juan Ford MD [id number]

## 2025-01-31 ENCOUNTER — OFFICE VISIT (OUTPATIENT)
Dept: SURGERY | Facility: CLINIC | Age: 70
End: 2025-01-31
Payer: COMMERCIAL

## 2025-01-31 DIAGNOSIS — N40.1 BPH WITH OBSTRUCTION/LOWER URINARY TRACT SYMPTOMS: Primary | ICD-10-CM

## 2025-01-31 DIAGNOSIS — N13.8 BPH WITH OBSTRUCTION/LOWER URINARY TRACT SYMPTOMS: Primary | ICD-10-CM

## 2025-01-31 PROCEDURE — 99213 OFFICE O/P EST LOW 20 MIN: CPT | Performed by: SURGERY

## 2025-01-31 RX ORDER — TAMSULOSIN HYDROCHLORIDE 0.4 MG/1
0.4 CAPSULE ORAL DAILY
Qty: 90 CAPSULE | Refills: 5 | Status: SHIPPED | OUTPATIENT
Start: 2025-01-31

## 2025-01-31 NOTE — PROGRESS NOTES
Urology Clinic Note    Primary Care Provider:  Marilyn Mason MD     Chief Complaint:   BPH    HPI & Assessment:   Claire Carrera is a 68 year old male with history of hypertension, hyperlipidemia, BPH/LUTS previously seen by Dr. Hoff last on 8/11/2022.  At that time he was satisfied with his voiding symptoms on tamsulosin 0.4 mg daily.     He is here with his son-in-law today who is translating for us.     He is doing well at this time.  He has a strong urinary stream.  Nocturia only once per night.  He is satisfied with his voiding symptoms at this time on tamsulosin.     PSA was 0.46 on 1/6/2023.    I saw him last on 12/22/2023.  At that time he was doing well with tamsulosin so I continued this and recommended yearly PSA until age 73.    DANIELE today shows small prostate with mild firmness on the right mid gland.    AUA symptom score is 4/1 with LUTS.    Urinalysis: Trace blood    Plan:   -Continue tamsulosin  -PSA now  -Return to clinic in 1 year for repeat PSA/DANIELE       PSA:  Lab Results   Component Value Date    PSAS 0.46 01/06/2023    PSAS 0.53 10/01/2021    PSAS 0.38 07/23/2020    PSAS 0.36 07/05/2018        History:     Past Medical History:    Allergic rhinitis    Essential hypertension    High cholesterol    Hyperlipidemia    Obesity    Tachycardia       Past Surgical History:   Procedure Laterality Date    Appendectomy  1986       Family History   Problem Relation Age of Onset    Cancer Father         Throat    Other (Other) Mother         Cirrhosis (not due to alcoholism)       Social History     Socioeconomic History    Marital status:    Tobacco Use    Smoking status: Never    Smokeless tobacco: Never   Vaping Use    Vaping status: Never Used   Substance and Sexual Activity    Alcohol use: Yes     Alcohol/week: 6.0 standard drinks of alcohol     Types: 2 Glasses of wine, 4 Shots of liquor per week     Comment: 3 drinks / week    Drug use: No   Other Topics Concern    Caffeine Concern Yes      Comment: Daily coffee    Exercise No    Seat Belt Yes    Special Diet No    Stress Concern No    Weight Concern Yes       Medications (Active prior to today's visit):  Current Outpatient Medications   Medication Sig Dispense Refill    amLODIPine 5 MG Oral Tab Take 1 tablet (5 mg total) by mouth daily. 90 tablet 0    pramipexole 0.25 MG Oral Tab Take 1 tablet (0.25 mg total) by mouth nightly as needed (restless leg). 90 tablet 3    methylPREDNISolone (MEDROL) 4 MG Oral Tablet Therapy Pack Take as directed 21 tablet 0    gabapentin 300 MG Oral Cap Take 1 capsule (300 mg total) by mouth nightly. 30 capsule 2    TAMSULOSIN 0.4 MG Oral Cap TAKE 1 CAPSULE BY MOUTH 30 MINUTES AFTER BREAKFAST 90 capsule 5    diclofenac 50 MG Oral Tab EC Take 1 tablet (50 mg total) by mouth 3 (three) times daily as needed. (Patient not taking: Reported on 5/17/2024) 45 tablet 3    hydroCHLOROthiazide 25 MG Oral Tab Take 1 tablet (25 mg total) by mouth daily. 90 tablet 1    atorvastatin 20 MG Oral Tab Take 1 tablet (20 mg total) by mouth daily.      metoprolol succinate ER 50 MG Oral Tablet 24 Hr Take 1 tablet (50 mg total) by mouth daily.         Allergies:  Allergies[1]    Review of Systems:   A comprehensive 10-point review of systems was completed.  Pertinent positives and negatives are noted in the the HPI.    Physical Exam:   CONSTITUTIONAL: Well developed, well nourished, in no acute distress  NEUROLOGIC: Alert and oriented  HEAD: Normocephalic, atraumatic  EYES: Sclera non-icteric  ENT: Hearing intact, moist mucous membranes  NECK: No obvious goiter or masses  RESPIRATORY: Normal respiratory effort  SKIN: No evident rashes  ABDOMEN: Soft, non-tender, non-distended  DIGITAL RECTAL EXAM: As above      In total, 20 minutes were spent on this patient encounter (including chart review, patient history, physical, counseling, documentation, and communication).    Juan Ford MD  Staff Urologist  [facility]  Office:  [phone]             [1]   Allergies  Allergen Reactions    Meloxicam OTHER (SEE COMMENTS)     \"Lower Eye Swelling\"    Aspirin RASH    Seasonal

## 2025-03-01 ENCOUNTER — LAB ENCOUNTER (OUTPATIENT)
Dept: LAB | Age: 70
End: 2025-03-01
Attending: SURGERY
Payer: COMMERCIAL

## 2025-03-01 DIAGNOSIS — N13.8 BPH WITH OBSTRUCTION/LOWER URINARY TRACT SYMPTOMS: ICD-10-CM

## 2025-03-01 DIAGNOSIS — N40.1 BPH WITH OBSTRUCTION/LOWER URINARY TRACT SYMPTOMS: ICD-10-CM

## 2025-03-01 LAB — PSA SERPL-MCNC: 0.48 NG/ML (ref ?–4)

## 2025-03-01 PROCEDURE — 84153 ASSAY OF PSA TOTAL: CPT

## 2025-03-01 PROCEDURE — 36415 COLL VENOUS BLD VENIPUNCTURE: CPT

## 2025-03-03 NOTE — PROGRESS NOTES
PSA 0.48    Future Appointments  1/30/2026  9:00 AM    Juan Ford MD           [id number]            [facility]

## 2025-03-27 DIAGNOSIS — I10 ESSENTIAL HYPERTENSION: ICD-10-CM

## 2025-03-27 RX ORDER — AMLODIPINE BESYLATE 5 MG/1
5 TABLET ORAL DAILY
Qty: 90 TABLET | Refills: 0 | Status: SHIPPED | OUTPATIENT
Start: 2025-03-27

## 2025-03-27 NOTE — TELEPHONE ENCOUNTER
LOV: 5/3/2024 with Dr. Mason  RTC: 3-4 months  Last Relevant Labs: 3/1/2025 (PSA)  Filled: 10/28/2024    #90 with 0 refills    Future Appointments   Date Time Provider Department Center   1/30/2026  9:00 AM Juan Ford MD [id number] [facility]

## 2025-04-16 RX ORDER — TAMSULOSIN HYDROCHLORIDE 0.4 MG/1
0.4 CAPSULE ORAL DAILY
Qty: 90 CAPSULE | Refills: 5 | Status: SHIPPED | OUTPATIENT
Start: 2025-04-16

## 2025-05-01 ENCOUNTER — OFFICE VISIT (OUTPATIENT)
Dept: PAIN CLINIC | Facility: CLINIC | Age: 70
End: 2025-05-01
Payer: COMMERCIAL

## 2025-05-01 DIAGNOSIS — M54.16 LUMBAR RADICULOPATHY: Primary | ICD-10-CM

## 2025-05-01 PROCEDURE — 99214 OFFICE O/P EST MOD 30 MIN: CPT | Performed by: PHYSICIAN ASSISTANT

## 2025-05-01 NOTE — PROGRESS NOTES
HPI:   Claire Carrera presents with complaints of right gluteal and posterior LE pain along posterior/lateral thigh and lower leg, stopping at ankle with n/t.    The pain is described as moderate n/t, burning that is intermittent.  The patient’s activity level has remained the same since last visit.  The pain is worst unrelated to time of day.    Changes in condition/history since last visit: Patient is here today for follow-up having been seen once previously a year ago on 5/17/2024.  At that time had radiating right lower extremity pain and was sent to physical therapy.  This was helpful, though with time, pain returned.  He had been given round of PO steroid, to good relief, though pain returned.      Last procedure: n/a    Date: n/a    Percentage of relief experienced from the procedure: n/a%    Duration of the relief: n/a    The following activities will increase the patient’s pain: walking, standing    The following activities decrease the patient’s pain: limiting activity level    Functional Assessment: Patient reports that they are able to complete all of their ADL's such as eating, bathing, using the toilet, dressing and getting up from a bed or a chair independently.    Current Medications:  Current Medications[1]   Patient requires assistance with: No assistance required    Reviewed Patient History Dated: 5/17/24 no changes noted    Physical Exam:   There were no vitals taken for this visit.  VAS Pain Score:  10/10  General Appearance: Well developed, well nourished, normal build, independent body habitus, no apparent physical disabilities, well groomed    Neurological Exam: WNL-Orientation to time, place and person, normal mood & effect, normal concentration & attention span  Inspection: non-antalgic, no acute distress   Radiology/Lab Test Reviewed: XR R knee 10/13/23:  CONCLUSION:  Mild degenerative or arthritic changes.  Small joint effusion.  No fracture or dislocation.   Lab Results   Component Value  Date    WBC 7.3 01/06/2023    WBC 6.6 10/01/2021    WBC 6.6 07/23/2020   No results found for: \"HEMOGLOBIN\"  Lab Results   Component Value Date    .0 01/06/2023    .0 10/01/2021    .0 07/23/2020         Do you have any known blood/bleeding disorders?  No  Does patient currently take blood thinners?   None  Does patient currently take any antibiotics?   No  Patient educated and verbalized understanding.  Medical Decision Making:   Diagnosis:    Encounter Diagnosis   Name Primary?    Lumbar radiculopathy Yes     Impression: Partial improvement with physical therapy, and has been active with HEP, to limited relief.  P.o. steroid was quite effective, though unfortunately pain returned.  Continues with evolving low back and radicular lower extremity pain with numbness and tingling into the posterior lateral lower leg.  Positive right straight leg raise on exam with pain with lumbar flexion.  Will have him obtain MRI of the lumbar spine prior to consideration of injections versus surgical discussion.    Plan: The following tests have been ordered: MRI L-spine.   Follow-up after imaging for discussion of plan of care (can be a virtual visit).    No orders of the defined types were placed in this encounter.      Meds & Refills for this Visit:  Requested Prescriptions      No prescriptions requested or ordered in this encounter       Imaging & Consults:  None    The patient indicates understanding of these issues and agrees to the plan.    SOLE Skinner           [1]   Current Outpatient Medications   Medication Sig Dispense Refill    TAMSULOSIN 0.4 MG Oral Cap TAKE 1 CAPSULE BY MOUTH 30 MINUTES AFTER BREAKFAST 90 capsule 5    amLODIPine 5 MG Oral Tab Take 1 tablet (5 mg total) by mouth daily. 90 tablet 0    hydroCHLOROthiazide 25 MG Oral Tab Take 1 tablet (25 mg total) by mouth daily. 90 tablet 1    atorvastatin 20 MG Oral Tab Take 1 tablet (20 mg total) by mouth daily.      metoprolol  succinate ER 50 MG Oral Tablet 24 Hr Take 1 tablet (50 mg total) by mouth daily.

## 2025-05-01 NOTE — PROGRESS NOTES
Patient presents in office today with reported pain in knee    Narcotic Contract renewal N/A    Urine Drug screen N/A

## 2025-05-01 NOTE — PATIENT INSTRUCTIONS
Refill policies:    Allow 2-3 business days for refills; controlled substances may take longer.  Contact your pharmacy at least 5 days prior to running out of medication and have them send an electronic request or submit request through the “request refill” option in your [employer] account.  Refills are not addressed on weekends; covering physicians do not authorize routine medications on weekends.  No narcotics or controlled substances are refilled after noon on Fridays or by on call physicians.  By law, narcotics must be electronically prescribed.  A 30 day supply with no refills is the maximum allowed.  If your prescription is due for a refill, you may be due for a follow up appointment.  To best provide you care, patients receiving routine medications need to be seen at least once a year.  Patients receiving narcotic/controlled substance medications need to be seen at least once every 3 months.  In the event that your preferred pharmacy does not have the requested medication in stock (e.g. Backordered), it is your responsibility to find another pharmacy that has the requested medication available.  We will gladly send a new prescription to that pharmacy at your request.    Scheduling Tests:    If your physician has ordered radiology tests such as MRI or CT scans, please contact Central Scheduling at [phone] right away to schedule the test.  Once scheduled, the [facility] Centralized Referral Team will work with your insurance carrier to obtain pre-certification or prior authorization.  Depending on your insurance carrier, approval may take 3-10 days.  It is highly recommended patients assure they have received an authorization before having a test performed.  If test is done without insurance authorization, patient may be responsible for the entire amount billed.      Precertification and Prior Authorizations:  If your physician has recommended that you have a procedure or additional testing performed the [facility]  Centralized Referral Team will contact your insurance carrier to obtain pre-certification or prior authorization.    You are strongly encouraged to contact your insurance carrier to verify that your procedure/test has been approved and is a COVERED benefit.  Although the [facility] Centralized Referral Team does its due diligence, the insurance carrier gives the disclaimer that \"Although the procedure is authorized, this does not guarantee payment.\"    Ultimately the patient is responsible for payment.   Thank you for your understanding in this matter.  Paperwork Completion:  If you require FMLA or disability paperwork for your recovery, please make sure to either drop it off or have it faxed to our office at [phone]. Be sure the form has your name and date of birth on it.  The form will be faxed to our Forms Department and they will complete it for you.  There is a 25$ fee for all forms that need to be filled out.  Please be aware there is a 10-14 day turnaround time.  You will need to sign a release of information (YOKASTA) form if your paperwork does not come with one.  You may call the Forms Department with any questions at [phone].  Their fax number is [phone].

## 2025-05-30 ENCOUNTER — HOSPITAL ENCOUNTER (OUTPATIENT)
Dept: MRI IMAGING | Facility: HOSPITAL | Age: 70
Discharge: HOME | End: 2025-05-30
Attending: PHYSICIAN ASSISTANT
Payer: COMMERCIAL

## 2025-05-30 DIAGNOSIS — M54.16 LUMBAR RADICULOPATHY: ICD-10-CM

## 2025-05-30 PROCEDURE — 72148 MRI LUMBAR SPINE W/O DYE: CPT | Performed by: PHYSICIAN ASSISTANT

## 2025-06-04 ENCOUNTER — PATIENT MESSAGE (OUTPATIENT)
Dept: PAIN CLINIC | Facility: CLINIC | Age: 70
End: 2025-06-04

## 2025-06-05 ENCOUNTER — VIRTUAL PHONE E/M (OUTPATIENT)
Dept: PAIN CLINIC | Facility: CLINIC | Age: 70
End: 2025-06-05
Payer: COMMERCIAL

## 2025-06-05 DIAGNOSIS — M54.16 RIGHT LUMBAR RADICULITIS: ICD-10-CM

## 2025-06-05 DIAGNOSIS — M43.16 SPONDYLOLISTHESIS OF LUMBAR REGION: Primary | ICD-10-CM

## 2025-06-05 DIAGNOSIS — M43.06 LUMBAR SPONDYLOLYSIS: ICD-10-CM

## 2025-06-05 DIAGNOSIS — M48.061 LUMBAR FORAMINAL STENOSIS: ICD-10-CM

## 2025-06-05 PROCEDURE — 98013 SYNCH AUDIO-ONLY EST LOW 20: CPT | Performed by: PHYSICIAN ASSISTANT

## 2025-06-05 NOTE — PROGRESS NOTES
Claire Carrera verbally consents to a tele Visit Check-In service on 06/05/25.    Duration of Service:  20 minutes      HPI:   Claire Carrera presents with complaints of right gluteal and posterior LE pain along posterior/lateral thigh and lower leg, stopping at ankle with n/t.    The pain is described as moderate n/t, burning that is intermittent.  The patient’s activity level has remained the same since last visit.  The pain is worst unrelated to time of day.    Changes in condition/history since last visit: Patient is here today via telephone encounter for follow-up having been seen last on 5/1/2025.  At that time, had been sent for MRI of the lumbar spine, and is here today to review.    Last procedure: n/a    Date: n/a    Percentage of relief experienced from the procedure: n/a%    Duration of the relief: n/a    The following activities will increase the patient’s pain: walking, standing    The following activities decrease the patient’s pain: limiting activity level    Functional Assessment: Patient reports that they are able to complete all of their ADL's such as eating, bathing, using the toilet, dressing and getting up from a bed or a chair independently.    Current Medications:  Current Medications[1]   Patient requires assistance with: No assistance required    Reviewed Patient History Dated: 5/17/24 no changes noted    Physical Exam:   There were no vitals taken for this visit.  VAS Pain Score:  10/10  General Appearance: Well developed, well nourished, normal build, independent body habitus, no apparent physical disabilities, well groomed    Neurological Exam: WNL-Orientation to time, place and person, normal mood & effect, normal concentration & attention span  Inspection: non-antalgic, no acute distress   Radiology/Lab Test Reviewed: XR R knee 10/13/23:  CONCLUSION:  Mild degenerative or arthritic changes.  Small joint effusion.  No fracture or dislocation.     MRI L spine 5/30/25:  CONCLUSION:       1.  Bilateral L5 pars defects with grade 2 anterolisthesis of L5 on S1.  This contributes to mild spinal canal stenosis at L5-S1 with bilateral subarticular zone stenosis.  There is also severe bilateral neural foraminal stenosis at L5-S1.      2. Mild to moderate left neural foraminal stenosis at L2-3.  Mild bilateral neural foraminal stenosis at L3-4.  Mild to moderate right and mild left neural foraminal stenosis at L4-5.      3. Facet arthropathy throughout the lumbar spine, most pronounced at L5-S1.       Lab Results   Component Value Date    WBC 7.3 01/06/2023    WBC 6.6 10/01/2021    WBC 6.6 07/23/2020   No results found for: \"HEMOGLOBIN\"  Lab Results   Component Value Date    .0 01/06/2023    .0 10/01/2021    .0 07/23/2020         Do you have any known blood/bleeding disorders?  No  Does patient currently take blood thinners?   None  Does patient currently take any antibiotics?   No  Patient educated and verbalized understanding.  Medical Decision Making:   Diagnosis:    Encounter Diagnoses   Name Primary?    Spondylolisthesis of lumbar region Yes    Lumbar spondylolysis     Right lumbar radiculitis     Lumbar foraminal stenosis        Impression: Partial improvement with physical therapy, and has been active with HEP, to limited relief.  P.o. steroid was quite effective, though unfortunately pain returned.  Continues with evolving low back and radicular lower extremity pain with numbness and tingling into the posterior lateral lower leg.  Positive right straight leg raise on exam with pain with lumbar flexion.  Did have him obtain MRI of the lumbar spine which did show bilateral L5 pars lysis with resultant grade 2 spondylolisthesis of L5 on S1 causing severe foraminal stenosis.  Did discuss options, and we will proceed with a right L5-S1 TF-ARABELLA, risks and benefits of which were reviewed in detail.  Follow-up 2 to 3 weeks.  In addition, order placed for surgical evaluation.    Plan: Patient  to schedule the following injection: Right L5 TF-ARABELLA Levels: L5-S1, Procedure and risks were discussed with pt. including headache, bleeding, infection and potential nerve damage.  Follow-up 2 to 3 weeks after injection.    No orders of the defined types were placed in this encounter.      Meds & Refills for this Visit:  Requested Prescriptions      No prescriptions requested or ordered in this encounter       Imaging & Consults:  None    The patient indicates understanding of these issues and agrees to the plan.    SOLE Skinner           [1]   Current Outpatient Medications   Medication Sig Dispense Refill    TAMSULOSIN 0.4 MG Oral Cap TAKE 1 CAPSULE BY MOUTH 30 MINUTES AFTER BREAKFAST 90 capsule 5    amLODIPine 5 MG Oral Tab Take 1 tablet (5 mg total) by mouth daily. 90 tablet 0    hydroCHLOROthiazide 25 MG Oral Tab Take 1 tablet (25 mg total) by mouth daily. 90 tablet 1    atorvastatin 20 MG Oral Tab Take 1 tablet (20 mg total) by mouth daily.      metoprolol succinate ER 50 MG Oral Tablet 24 Hr Take 1 tablet (50 mg total) by mouth daily.

## 2025-06-12 ENCOUNTER — TELEPHONE (OUTPATIENT)
Dept: PAIN CLINIC | Facility: CLINIC | Age: 70
End: 2025-06-12

## 2025-06-12 DIAGNOSIS — M54.16 LUMBAR RADICULITIS: Primary | ICD-10-CM

## 2025-06-12 NOTE — TELEPHONE ENCOUNTER
Prior authorization request completed for: right L5/S1 TLESI  Authorization # no auth needed   Pre-D: no  Exclusions/Restrictions: no   Covered Benefit: yes   Authorization dates: n/a  CPT codes approved: 08786  Number of visits/dates of service approved: 1  Physician: matt  Location: [facility]   Call Ref#: [id number]  Representative Name: [facility]   Insurance Carrier: [employer] [phone]    Patient can be scheduled. Routed to Navigator.

## 2025-06-13 NOTE — TELEPHONE ENCOUNTER
Spoke with patient's daughter Janna (Ok'd per HIPAA) to schedule patient's injection.    Janna advised of insurance approval to proceed with injections and is agreeable to scheduling.  pre-procedure instructions reviewed. Patient prefers Local sedation. Reviewed sedation instructions including No Fasting & No  Required. Patient has no medications to hold prior to procedure. Janna verbalized understanding of instructions, no further needs at this time.    Janna asked if patient can return to work after injection (same day) ?   -- informed Janna that patients are recommended to rest the remainder part of the day. The day after injection patient's can resume normal activity. Janna AMARO.      [facility]  PRE-PROCEDURE INSTRUCTIONS WITHOUT SEDATION    Procedure: Right L5/S1 TLESI       Appointment Date: 06/24/2025  Check-In Time: 08:00 AM        Prior to the procedure:  Please update us prior to the procedure if you are experiencing any symptoms of infection such as cough, fever, chills, urinary symptoms, or have recently been prescribed antibiotics, have open wounds, have recently had surgery or dental procedures.    Day of Procedure:  **Drivers will be required for patients who receive prescriptions for Valium.    NO FASTING REQUIRED  Please bring your Insurance Card, Photo ID, List of Current Medications and Referral (if applicable) to your appointment.  Please park in the [employer] [facility] and follow the signs to the [facility].  Check in at [facility] ([address] [place] [address]) outpatient registration in the [facility].  Please note-No prescriptions will be written by Pain Clinic in [place] on the day of procedure. If you require a refill of medications, please contact the office 48 hours prior to your procedure.  If you have an implanted Spinal Cord or Peripheral Nerve Stimulator: Please remember to turn device off for procedure.        Medication Hold:    Number of days you  need to be off for the following medications:    Aggrenox 10 days   Agrylin (Anagrelide) 10 days  Brilinta (Ticagrelor) 7 days  Imbruvica (Ibrutinib) 3 days   Enbrel (Etanercept) 24 hours   Fragmin (Dalteparin) 24 hours   Pletal (Cilostazol) 7 days  Effient (Prasugrel) 7 days  Pradaxa 10 days  Trental 7 days  Eliquis (Apixaban) 3 days  Xarelto (Rivaroxaban) 3 days  Lovenox (Enoxaparin) 24 hours  Aspirin  Greater than 81mg but less than 325mg   5 days  325mg and greater                  7 days  Coumadin       5 days  Procedure may be cancelled if INR is elevated.   Excedrin (with aspirin) 7 days  Plavix (Clopidogrel)                            7 days    NSAIDs: 24 hours preferred      Ibuprofen (Motrin, Advil, Vicoprofen), Naproxen (Naprosyn, Aleve), Piroxcam (Feldene), Meloxicam (Mobic), Oxaprozin (Daypro), Diclofenac (Voltaren), Indomethacin (Indocin), Etodolac (Lodine), Nabumetone (Relafen), Celebrex (Celecoxib)           HERBAL SUPPLEMENTS  5 days preferred  Fish oil, krill oil, Omega-3, Vascepa, Vitamin E, Turmeric, Garlic                       Insurance Authorization:   Most insurances are now requiring a preauthorization for all procedures.  In the event that your insurance does not authorize your procedure within 48 hours of the scheduled date, your procedure will be cancelled and rescheduled to a later date.  Please contact your insurance carrier to determine what your financial responsibility will be for the procedure(s).      Cancellation/Rescheduling Appointment:   In the event you need to cancel or reschedule your appointment, you must notify the office 24 hours prior.    Post-procedure instructions:        Please schedule a follow up visit within 2 to 4 weeks after your last procedure date   Please call our office with any questions or concerns before or after your procedure at  [phone].  If you are a diabetic, please increase the frequency of your glucose monitoring after the procedure as this may  cause a temporary increase in your blood sugar.  Contact your primary care physician if your blood sugar rises as you may require some medication adjustment.  It is normal to have increased pain at injection site for up to 3-5 days after procedure, you can use heat or ice (20 minutes on 20 minutes off) for comfort.    **To hear a recorded version of these instructions, please call [phone] and follow the prompts.  **Para escuchar las instrucciones en Español, por favor de llamar el maira [phone] opción 4.

## 2025-06-23 DIAGNOSIS — M25.50 CHRONIC JOINT PAIN: ICD-10-CM

## 2025-06-23 DIAGNOSIS — G89.29 CHRONIC JOINT PAIN: ICD-10-CM

## 2025-06-23 NOTE — TELEPHONE ENCOUNTER
LOV: 5/3/2024 with Dr. Mason  RTC: 3-4 months  Last Relevant Labs: 3/1/2025 (PSA)  Filled: 6/9/2022    #60 with 2 refills    Future Appointments   Date Time Provider Department Center   1/30/2026  9:00 AM Juan Ford MD [id number]

## 2025-06-24 ENCOUNTER — APPOINTMENT (OUTPATIENT)
Dept: GENERAL RADIOLOGY | Facility: HOSPITAL | Age: 70
End: 2025-06-24
Attending: ANESTHESIOLOGY
Payer: COMMERCIAL

## 2025-06-24 ENCOUNTER — HOSPITAL ENCOUNTER (OUTPATIENT)
Facility: HOSPITAL | Age: 70
Setting detail: HOSPITAL OUTPATIENT SURGERY
Discharge: HOME | End: 2025-06-24
Attending: ANESTHESIOLOGY | Admitting: ANESTHESIOLOGY
Payer: COMMERCIAL

## 2025-06-24 VITALS
TEMPERATURE: 97 F | WEIGHT: 222 LBS | DIASTOLIC BLOOD PRESSURE: 85 MMHG | RESPIRATION RATE: 18 BRPM | HEIGHT: 65 IN | OXYGEN SATURATION: 94 % | BODY MASS INDEX: 36.99 KG/M2 | SYSTOLIC BLOOD PRESSURE: 156 MMHG | HEART RATE: 50 BPM

## 2025-06-24 DIAGNOSIS — G89.29 CHRONIC JOINT PAIN: ICD-10-CM

## 2025-06-24 DIAGNOSIS — M25.50 CHRONIC JOINT PAIN: ICD-10-CM

## 2025-06-24 PROBLEM — M54.16 LUMBAR RADICULITIS: Status: ACTIVE | Noted: 2025-06-24

## 2025-06-24 PROCEDURE — 64483 NJX AA&/STRD TFRM EPI L/S 1: CPT | Performed by: ANESTHESIOLOGY

## 2025-06-24 RX ORDER — IOPAMIDOL 408 MG/ML
INJECTION, SOLUTION INTRATHECAL
Status: DISCONTINUED | OUTPATIENT
Start: 2025-06-24 | End: 2025-06-24

## 2025-06-24 RX ORDER — SODIUM CHLORIDE 9 MG/ML
INJECTION, SOLUTION INTRAMUSCULAR; INTRAVENOUS; SUBCUTANEOUS
Status: DISCONTINUED | OUTPATIENT
Start: 2025-06-24 | End: 2025-06-24

## 2025-06-24 RX ORDER — DICLOFENAC SODIUM 75 MG/1
75 TABLET, DELAYED RELEASE ORAL 2 TIMES DAILY PRN
Qty: 180 TABLET | Refills: 0 | OUTPATIENT
Start: 2025-06-24

## 2025-06-24 RX ORDER — DICLOFENAC SODIUM 75 MG/1
75 TABLET, DELAYED RELEASE ORAL 2 TIMES DAILY PRN
Qty: 180 TABLET | Refills: 0 | Status: SHIPPED | OUTPATIENT
Start: 2025-06-24

## 2025-06-24 RX ORDER — LIDOCAINE HYDROCHLORIDE 10 MG/ML
INJECTION, SOLUTION EPIDURAL; INFILTRATION; INTRACAUDAL; PERINEURAL
Status: DISCONTINUED | OUTPATIENT
Start: 2025-06-24 | End: 2025-06-24

## 2025-06-24 RX ORDER — NALOXONE HYDROCHLORIDE 0.4 MG/ML
0.08 INJECTION, SOLUTION INTRAMUSCULAR; INTRAVENOUS; SUBCUTANEOUS AS NEEDED
Status: DISCONTINUED | OUTPATIENT
Start: 2025-06-24 | End: 2025-06-24

## 2025-06-24 RX ORDER — DEXAMETHASONE SODIUM PHOSPHATE 10 MG/ML
INJECTION, SOLUTION INTRAMUSCULAR; INTRAVENOUS
Status: DISCONTINUED | OUTPATIENT
Start: 2025-06-24 | End: 2025-06-24

## 2025-06-24 NOTE — OPERATIVE REPORT
[facility]  Operative Report  2025     Claire Carrera Patient Status:  Hospital Outpatient Surgery    1955 MRN [id number]   Location [facility] Attending Chris Calderon MD   Hosp Day # 0 PCP Marilyn Mason MD     Indication: Claire is a 69 year old male lumbar radiculitis. MRI reviewed consistent with radiculopathy.    Preoperative Diagnosis:  Lumbar radiculitis [M54.16]    Postoperative Diagnosis: Same as above.    Procedure performed: Right L5/S1 TRANSFORAMINAL LUMBAR EPIDURAL STEROID INJECTION SINGLE LEVEL with local    Anesthesia: Local      EBL: Less than 1 ml.    Procedure Description:   After reviewing the patient's history and performing a focused physical examination, the diagnosis was confirmed and contraindications such as infection and coagulopathy were ruled out.  Following review of potential side effects and complications, including but not necessarily limited to infection, allergic reaction, local tissue breakdown, nerve injury, and paresis, the patient indicated they understood and agreed to proceed. After obtaining the informed consent, the patient was brought to the procedure room and monitored.      In the prone position, following sterile prep and drape of the lumbar region, the L5 neural foramen was identified under fluoroscopy.  The skin and subcutaneous tissue was anesthetized via 25-gauge 1.5\" needle with approximately 2 cc of 1% lidocaine.  A 22-gauge 5\" Quincke spinal needle was introduced toward the inferior aspect of the junction between the transverse process and pedicle of the L5 level atraumatically under fluoroscopic guidance. The needle was advanced into the anterior epidural space at this level. The needle position was confirmed under AP and lateral fluoroscopic view.  Following negative aspiration for CSF and blood, approximately 1 cc of Omnipaque 240 was injected.  An excellent contrast spread along the epidural space and the nerve root  was obtained.  At this point, 2 cc of normal saline with 10 mg of dexamethasone was injected without complication.  The needle was withdrawn with stylet in situ. The patient tolerated procedure very well.  The patient was observed until discharge criteria met.  Discharge instructions were given and patient was released to a responsible adult.       Complications: None.    Follow up: The patient was followed in the pain clinic as needed basis.      Chris Calderon MD

## 2025-06-24 NOTE — DISCHARGE INSTRUCTIONS
Home Care Instructions Following Your Pain Procedure     Telat,  It has been a pleasure to have you as our patient. To help you at home, you must follow these general discharge instructions. We will review these with you before you are discharged. It is our hope that you have a complete and uneventful recovery from our procedure.     General Instructions:  What to Expect:  Bandages from your procedure today can be removed when you get home.  Please avoid soaking and/or swimming for 24 hours.  Showering is okay  It is normal to have increased pain symptoms and/or pain at injection site for up to 3-5 days after procedure, you can use heat or ice (20 minutes on 20 minutes off) for comfort.  You may experience some temporary side effects which may include restlessness or insomnia, flushing of the face, or heart palpitations.  Please contact the provider if these symptoms do not resolve within 3-4 days.  Lightheadedness or nausea may occur and should resolve within 24 to 48 hours.  If you develop a headache after treatment, rest, drink fluids (with caffeine, if possible) and take mild over-the-counter pain medication.  If the headache does not improve with the above treatment, contact the physician.  Home Medications:  Resume all previously prescribed medication.  Please avoid taking NSAIDs (Non-Steriodal Anti-Inflammatory Drugs) such as:  Ibuprofen ( Advil, Motrin) Aleve (Naproxen), Diclofenac, Meloxicam for 6 hours after procedure.   If you are on Coumadin (Warfarin) or any other anti-coagulant (or \"blood thinning\") medication such as Plavix (Clopidogrel), Xarelto (Rivaroxaban), Eliquis (Apixaban), Effient (Prasugrel) etc., restart on the following day from the procedure unless otherwise directed by your provider.  If you are a diabetic, please increase the frequency of your glucose monitoring after the procedure as steroids may cause a temporary (2-3 day) increase in your blood sugar.  Contact your primary care  physician if your blood sugar remains elevated as you may require some medication adjustment.  Diet:  Resume your regular diet as tolerated.  Activity:  We recommend that you relax and rest during the rest of your procedure day.  If you feel weakness in your arms or legs do not drive.  Follow-up Appointment  Please schedule a follow-up visit within 3 to 4 weeks after your last procedure date.  Question or Concerns:  Feel free to call our office with any questions or concerns at [phone] (option #2)    Telbenedict  Thank you for coming to [facility] for your procedure.  The nurses try very hard to make sure you receive the best care possible.  Your trust in them as well as us is greatly appreciated.    Thanks so much,   Dr. Chris Calderon

## 2025-06-24 NOTE — H&P
History & Physical Examination    Patient Name: Claire Carrera  MRN: [id number]  [facility]: [id number]  YOB: 1955    Pre-Operative Diagnosis:  Lumbar radiculitis [M54.16]    Present Illness: Lumbar radiculitis    ASA: 3  MP class: 1  Sedation: Local   Prescriptions Prior to Admission[1]  Current Hospital Medications[2]    Allergies: Allergies[3]    Past Medical History[4]  Past Surgical History[5]  Family History[6]  Social History     Tobacco Use    Smoking status: Never    Smokeless tobacco: Never   Substance Use Topics    Alcohol use: Yes     Alcohol/week: 6.0 standard drinks of alcohol     Types: 2 Glasses of wine, 4 Shots of liquor per week     Comment: 3 drinks / week       SYSTEM Check if Review is Normal Check if Physical Exam is Normal If not normal, please explain:   HEENT [x ] [x ]    NECK & BACK [x ] [x ]    HEART [x ] [x ]    LUNGS [x ] [x ]    ABDOMEN [x ] [x ]    UROGENITAL [x ] [x ]    EXTREMITIES [x ] [x ]    OTHER        [ x ] I have discussed the risks and benefits and alternatives with the patient/family.  They understand and agree to proceed with plan of care.  [ x ] I have reviewed the History and Physical done within the last 30 days.  Any changes noted above.    Chris Calderon MD              [1]   Medications Prior to Admission   Medication Sig Dispense Refill Last Dose/Taking    TAMSULOSIN 0.4 MG Oral Cap TAKE 1 CAPSULE BY MOUTH 30 MINUTES AFTER BREAKFAST 90 capsule 5     amLODIPine 5 MG Oral Tab Take 1 tablet (5 mg total) by mouth daily. 90 tablet 0     hydroCHLOROthiazide 25 MG Oral Tab Take 1 tablet (25 mg total) by mouth daily. 90 tablet 1     atorvastatin 20 MG Oral Tab Take 1 tablet (20 mg total) by mouth daily.       metoprolol succinate ER 50 MG Oral Tablet 24 Hr Take 1 tablet (50 mg total) by mouth daily.      [2]   No current facility-administered medications for this encounter.   [3]   Allergies  Allergen Reactions    Meloxicam OTHER (SEE COMMENTS)     \"Lower Eye Swelling\"     Aspirin RASH    Seasonal    [4]   Past Medical History:   Allergic rhinitis    Essential hypertension    High cholesterol    Hyperlipidemia    Obesity    Tachycardia   [5]   Past Surgical History:  Procedure Laterality Date    Appendectomy  1986   [6]   Family History  Problem Relation Age of Onset    Cancer Father         Throat    Other (Other) Mother         Cirrhosis (not due to alcoholism)

## 2025-06-25 ENCOUNTER — TELEPHONE (OUTPATIENT)
Dept: PAIN CLINIC | Facility: CLINIC | Age: 70
End: 2025-06-25

## 2025-06-25 NOTE — TELEPHONE ENCOUNTER
Courtaron called placed to patient for post procedure follow up. Patient stated feeling better today.  Pt verbalized understanding to call with any questions or concerns.      Procedure: TLESI  Date: 6/24/2025  Follow up Visit Scheduled:

## (undated) DIAGNOSIS — M25.50 CHRONIC JOINT PAIN: ICD-10-CM

## (undated) DIAGNOSIS — I10 ESSENTIAL HYPERTENSION: ICD-10-CM

## (undated) DIAGNOSIS — G89.29 CHRONIC JOINT PAIN: ICD-10-CM

## (undated) DIAGNOSIS — E78.2 MIXED HYPERLIPIDEMIA: ICD-10-CM

## (undated) DEVICE — GLOVE,SURG,SENSICARE,ALOE,LF,PF,7: Brand: MEDLINE

## (undated) DEVICE — NEEDLE SPNL 22GA L5IN BLK HUB QNCKE BVL DISP

## (undated) DEVICE — PAIN TRAY: Brand: MEDLINE INDUSTRIES, INC.

## (undated) DEVICE — GLOVE SUR 7.5 SENSICARE PIP WHT PWD F

## (undated) DEVICE — SKIN REG/FINE DUAL MARKER, RULER, LABELS: Brand: MEDLINE

## (undated) DEVICE — BANDAGE,ADHESIVE,FABRIC,1"X3",STRL,LF: Brand: CURAD

## (undated) DEVICE — REMOVER LOT 4OZ N IRRIG UNSCNT SFT MOIST LIQ

## (undated) NOTE — LETTER
05/23/19        Claire Carrera  [address] [id number]      Dear Pamela Mccann,    Our records indicate that you have outstanding lab work and or testing that was ordered for you and has not yet been completed: Fasting lab work   To complete the l

## (undated) NOTE — LETTER
02/27/21        Claire Carrera  [address] [facility] [address]      Dear [place],    Our records indicate that you have outstanding lab work and or testing that was ordered for you and has not yet been completed:  Repeat Fasting Lab Work       Lipid